# Patient Record
Sex: FEMALE | Race: WHITE
[De-identification: names, ages, dates, MRNs, and addresses within clinical notes are randomized per-mention and may not be internally consistent; named-entity substitution may affect disease eponyms.]

---

## 2020-12-21 ENCOUNTER — HOSPITAL ENCOUNTER (OUTPATIENT)
Dept: HOSPITAL 76 - DI.N | Age: 40
Discharge: HOME | End: 2020-12-21
Attending: FAMILY MEDICINE
Payer: MEDICARE

## 2020-12-21 DIAGNOSIS — M25.511: Primary | ICD-10-CM

## 2020-12-21 NOTE — XRAY REPORT
PROCEDURE:  Shoulder 3 View RT

 

INDICATIONS:  RIGHT SHOULDER PAIN

 

TECHNIQUE:  3 views of the shoulder were acquired.  

 

COMPARISON:  None.

 

FINDINGS:  

 

Bones:  No fractures or dislocations.  No suspicious bony lesions.  Visualized ribs appear intact.  

 

Soft tissues:  No suspicious soft tissue calcifications.  

 

IMPRESSION:  Unremarkable radiographic examination of right shoulder. If indicated, MRI of shoulder c
an be done for further evaluation.

 

Reviewed by: Shakeel Flanagan MD on 12/21/2020 10:33 AM PST

Approved by: Shakeel Flanagan MD on 12/21/2020 10:33 AM PST

 

 

Station ID:  [id number]

## 2021-02-16 ENCOUNTER — HOSPITAL ENCOUNTER (OUTPATIENT)
Dept: HOSPITAL 76 - DI | Age: 41
Discharge: HOME | End: 2021-02-16
Attending: OBSTETRICS & GYNECOLOGY
Payer: MEDICARE

## 2021-02-16 DIAGNOSIS — Z30.431: Primary | ICD-10-CM

## 2021-02-17 NOTE — ULTRASOUND REPORT
PROCEDURE:  Pelvic w/Transvaginal

 

INDICATIONS:  IUD CHECK

 

TECHNIQUE:  

Real-time scanning was performed of the pelvic organs, with image documentation.  Additional endovagi
nal scanning was necessary due to incomplete visualization of the adnexal and endometrial structures 
by transabdominal scanning.  

 

COMPARISON:  None.

 

FINDINGS:  

No pathologic free abdominal or pelvic fluid.  

 

Uterus:  Uterus is normal in size at 3.8 x 4.8 x 7.6 cm.  The endometrium measures normal in combined
 thickness, and there is a centrally positioned IUD..  

 

Ovaries:  Show no evidence of abnormal enlargement, solid mass or dominant cyst.

 

IMPRESSION:  

Centrally positioned IUD, no solid mass lesion involving the normal sized ovaries.

 

Reviewed by: Constantin Smith MD on 2/17/2021 10:49 AM PST

Approved by: Constantin Smith MD on 2/17/2021 10:49 AM PST

 

 

Station ID:  [id number]

## 2021-04-19 ENCOUNTER — HOSPITAL ENCOUNTER (OUTPATIENT)
Dept: HOSPITAL 76 - LAB | Age: 41
Discharge: HOME | End: 2021-04-19
Attending: FAMILY MEDICINE
Payer: MEDICARE

## 2021-04-19 DIAGNOSIS — Z79.899: Primary | ICD-10-CM

## 2021-04-19 LAB
AMPHET UR QL SCN: NEGATIVE
BARBITURATES UR QL SCN>300 NG/ML: NEGATIVE
BENZODIAZ UR QL SCN: POSITIVE
COCAINE UR-SCNC: NEGATIVE UMOL/L
METHADONE UR QL SCN: NEGATIVE
METHAMPHET UR QL SCN: NEGATIVE
OPIATES UR QL SCN: NEGATIVE
THC UR QL SCN: POSITIVE
VOLATILE DRUGS POS SERPL SCN: (no result)

## 2021-04-19 PROCEDURE — 80306 DRUG TEST PRSMV INSTRMNT: CPT

## 2021-08-03 ENCOUNTER — HOSPITAL ENCOUNTER (EMERGENCY)
Dept: HOSPITAL 76 - ED | Age: 41
Discharge: HOME | End: 2021-08-03
Payer: MEDICARE

## 2021-08-03 VITALS — SYSTOLIC BLOOD PRESSURE: 131 MMHG | DIASTOLIC BLOOD PRESSURE: 68 MMHG

## 2021-08-03 DIAGNOSIS — M25.561: ICD-10-CM

## 2021-08-03 DIAGNOSIS — R07.89: Primary | ICD-10-CM

## 2021-08-03 LAB
D DIMER CSF LA.DDU-MCNC: < 200 NG/ML (ref 200–255)
INR PPP: 1.3 (ref 0.8–1.2)
PROTHROM ACT/NOR PPP: 14 SECS (ref 9.9–12.6)

## 2021-08-03 PROCEDURE — 85610 PROTHROMBIN TIME: CPT

## 2021-08-03 PROCEDURE — 36415 COLL VENOUS BLD VENIPUNCTURE: CPT

## 2021-08-03 PROCEDURE — 85379 FIBRIN DEGRADATION QUANT: CPT

## 2021-08-03 PROCEDURE — 99284 EMERGENCY DEPT VISIT MOD MDM: CPT

## 2021-08-03 NOTE — ED PHYSICIAN DOCUMENTATION
PD HPI CHEST PAIN





- Stated complaint


Stated Complaint: R KNEE PX





- Chief complaint


Chief Complaint: Cardiac





- History obtained from


History obtained from: Patient





- Additional information


Additional information: 


Patient comes emergency department chief complaint of pain behind right knee and

intermittent left-sided chest pain.  Patient states she feels as though the knee

has been flaring up for the last few days.  She states that it started with a 

"catching" pain deep inside her knee, and that when that flares up, she also 

gets a pulse of pain in her left anterior chest superior to her breast.  Patient

denies any shortness of breath, but states that sometimes it hurts to take a 

deep breath.  She has had some swelling around the knee, but denies distinct 

injury.  The only thing that she can think of is that about a week ago, she 

tried to kick a soccer ball for her dog and missed the ball and ended up kicking

the ground instead.  However, she states this hurt her toe more than anything.  

The patient does have a distant history of a knee surgery for a chronically 

dislocated patella, she reports.  This was done when she was a teenager.  

Patient's concern with the knee pain and the chest pain is that she does have a 

cousin who  of a pulmonary embolism and patient wants to make sure this is 

not an issue for her.  Patient is not a smoker.  No recent long trips.  No 

hormonal contraceptives.  She is not pregnant.








Review of Systems


Ten Systems: 10 systems reviewed and negative


Constitutional: reports: Reviewed and negative


Eyes: reports: Reviewed and negative


Ears: reports: Reviewed and negative


Nose: reports: Reviewed and negative


Throat: reports: Reviewed and negative


Cardiac: reports: Chest pain / pressure


Respiratory: reports: Reviewed and negative.  denies: Dyspnea


GI: reports: Reviewed and negative


: reports: Reviewed and negative


Skin: reports: Reviewed and negative


Musculoskeletal: reports: Joint pain, Joint swelling


Neurologic: reports: Reviewed and negative


Psychiatric: reports: Reviewed and negative


Endocrine: reports: Reviewed and negative


Immunocompromised: reports: Reviewed and negative





PD PAST MEDICAL HISTORY





- Past Medical History


Past Medical History: Yes


Cardiovascular: Arrhythmia


Respiratory: Asthma


Neuro: Head injury, Migraines


Endocrine/Autoimmune: None


GI: Chronic diarrhea, Other


GYN: Ovarian cysts


: Other


HEENT: None


Psych: None


Musculoskeletal: Osteoarthritis, Fibromyalgia


Derm: Psoriasis


Other Past Medical History: IBS





- Past Surgical History


Past Surgical History: Yes


General: Appendectomy


Ortho: Arthroscopic surgery


HEENT: Other





- Present Medications


Home Medications: 


                                Ambulatory Orders











 Medication  Instructions  Recorded  Confirmed


 


Albuterol 2.5 mg INH Q4H PRN 13


 


Cetirizine [ZyrTEC] 10 mg PO DAILY 13


 


Cranberry 400 mg PO 13


 


Dicyclomine [Bentyl] 20 mg PO QID 13


 


Diphenoxylate HCl/Atropine 1 each PO 13





[Lomotil Tablet]   


 


Multivitamin,Ther and Minerals 1 each PO 13





[Vitamin and Minerals]   


 


Ondansetron Oral Soln [Zofran] 4 mg PO 13


 


Simvastatin [Zocor] 20 mg PO 13


 


Triamcinolone Acetonide [Nasacort 16.5 gm NS 13





Aq]   


 


Zolpidem Tartrate [Ambien] 5 mg PO 13


 


atenoloL [Tenormin] 25 mg PO DAILY 13


 


Oxycodone HCl/Acetaminophen 1 - 2 each PO Q6H PRN #10 tablet 14 





[Percocet 5-325 mg Tablet]   














- Allergies


Allergies/Adverse Reactions: 


                                    Allergies











Allergy/AdvReac Type Severity Reaction Status Date / Time


 


Penicillins Allergy Intermediate Hives Verified 21 13:41


 


codeine [Codeine] Allergy  Hives Verified 21 13:41














- Social History


Does the pt smoke?: No


Smoking Status: Never smoker


Does the pt drink ETOH?: No


Does the pt have substance abuse?: No


Substance Use and Type: Marijuana





- Immunizations


Immunizations are current?: Yes





PD ED PE NORMAL





- Vitals


Vital signs reviewed: Yes





- General


General: Alert and oriented X 3, No acute distress, Well developed/nourished





- HEENT


HEENT: Atraumatic, PERRL, EOMI, Moist mucous membranes





- Neck


Neck: Supple, no meningeal sign





- Cardiac


Cardiac: RRR, No murmur





- Respiratory


Respiratory: No respiratory distress, Clear bilaterally





- Abdomen


Abdomen: Soft, Non tender, Non distended





- Derm


Derm: Normal color, Warm and dry, No rash





- Extremities


Extremities: No deformity, No edema, Other (Obese legs; no obvious edema; 

tenderness of R popliteal area and several centimeters distally.  No cords, 

mass, or knee instability.)





- Neuro


Neuro: Alert and oriented X 3, CNs 2-12 intact, No motor deficit, No sensory 

deficit, Normal speech





- Psych


Psych: Normal mood, Normal affect





PD ED PE EXPANDED





- Free text exam


Free text exam: 





Reproducible chest pain/tenderness left anterior superior chest wall between 

ribs 2 and 4.





Results





- Vitals


Vitals: 


                               Vital Signs - 24 hr











  21





  13:35 14:03 16:28


 


Temperature 66 C H 37.1 C 36.7 C


 


Heart Rate 68 66 56 L


 


Respiratory 18 18 16





Rate   


 


Blood Pressure 150/79 H 145/96 H 131/68 H


 


O2 Saturation 98 99 97








                                     Oxygen











O2 Source                      Room air

















- Labs


Labs: 


                                Laboratory Tests











  21





  13:55


 


PT  14.0 H


 


INR  1.3 H


 


D-Dimer  < 200.0 L














- Rads (name of study)


  ** R knee xr


Radiology: Final report received, EMP read indepedently, See rad report (neg)





  ** rle us


Radiology: Final report received, See rad report (neg dvt)





  ** cxr


Radiology: Final report received, EMP read indepedently, See rad report (neg)





PD MEDICAL DECISION MAKING





- ED course


Complexity details: reviewed results, re-evaluated patient, considered 

differential, d/w patient


ED course: 


Patient was worked up with ultrasound of the right lower extremity, as well as 

x-rays of the knee and chest and a D-dimer.  The entire work-up was negative.  I

 discussed with the patient that I am not sure what has caused her knee to 

flareup, but I do not find evidence of a DVT.  As far as her chest, she has 

point tenderness and I suspect, based on this and the increased pain with deep 

breaths, as well as the negative x-ray, that the pain represents a 

musculoskeletal/chest wall source.  We have discussed management of the symptoms

 at home, as well as the usual indications for return.








Departure





- Departure


Disposition: 01 Home, Self Care


Clinical Impression: 


 Chest wall pain





Knee pain, right


Qualifiers:


 Chronicity: acute Qualified Code(s): M25.561 - Pain in right knee





Condition: Stable


Instructions:  ED Chest Pain Costochondritis, ED Knee Pain UKO


Comments: 


Your chest x-ray, knee x-ray, and leg ultrasound all look good.  The lab that we

 check to look for blood clots, the D-dimer, looks good alsoit is completely 

negative.  This is a good sign in terms of any concern over blood clot.  It is 

not clear exactly what is causing your knee pain, but In general, flareups like 

this tend to resolve on their own.  If you have ongoing knee pain for more than 

the next few weeks, you should make an appointment in primary care to discuss 

having an MRI done.  Your chest pain sounds mechanical and as it is coming from 

the chest wall.  Your x-ray does not show any concerning findings to raise 

suspicion for anything else.  As we have discussed, between the negative leg 

ultrasound and the negative D-dimer, it is extremely unlikely that you have a 

blood clot.  Please follow-up with your primary doctor as needed.  Take 

ibuprofen and Tylenol and use ice if needed to help with your knee and chest 

wall pain.


Discharge Date/Time: 21 16:39

## 2021-08-03 NOTE — ULTRASOUND REPORT
PROCEDURE:  Duplex Ext Veins Right

 

INDICATIONS:  R leg pain, swelling

 

TECHNIQUE:  

Real-time imaging, as well as color and pulse Doppler interrogation, were performed of the lower extr
emity deep veins from the inguinal ligament to the popliteal fossa.  

 

COMPARISON:  None.

 

FINDINGS:  The deep veins are normally compressible, and free of intraluminal thrombus.  Color and pu
lse Doppler demonstrate normal phasic intraluminal flow.  There is normal augmentation response to di
stal compression maneuver.  

 

IMPRESSION:  No sonographic evidence of DVT with caveat that the exam is mildly limited by the degree
 of lower chimney edema below the level of the calf.

 

Reviewed by: Arturo Subramanian MD on 8/3/2021 3:12 PM PDT

Approved by: Arturo Subramanian MD on 8/3/2021 3:12 PM PDT

 

 

Station ID:  [id number]

## 2021-08-03 NOTE — XRAY REPORT
PROCEDURE:  Chest 1 View X-Ray

 

INDICATIONS:  chest pain

 

TECHNIQUE:  One view of the chest was acquired.  

 

COMPARISON:  Plain films dated 9/8/2013

 

FINDINGS:  

 

Surgical changes and devices:  None.  

 

Lungs and pleura:  No pleural effusions or pneumothorax.  Lungs are clear.  

 

Mediastinum:  Mediastinal contours appear normal.  Heart size is normal.  

 

Bones and chest wall:  No suspicious bony lesions.  Overlying soft tissues appear unremarkable.  

 

IMPRESSION:  

No acute process.

 

Reviewed by: Catalina Hutchinson MD on 8/3/2021 2:11 PM PDT

Approved by: Catalina Hutchinson MD on 8/3/2021 2:11 PM PDT

 

 

Station ID:  [id number]

## 2021-08-03 NOTE — XRAY REPORT
PROCEDURE:  Knee 3 View RT

 

INDICATIONS:  pain/swelling

 

TECHNIQUE:  3 views of the     knee(s) were acquired.  

 

COMPARISON:  None.

 

FINDINGS:  

 

Bones:  No fractures or dislocations.  No suspicious bony lesions.  

 

Soft tissues:  No joint effusion.  No suspicious soft tissue calcifications.  

 

IMPRESSION:  No acute fracture. No osseous lesion. If symptoms and/or clinical suspicion for patholog
y continue, further assessment with repeat plain films, or advanced imaging (e.g., CT, MRI, or bone s
can) is recommended for further assessment.

 

Reviewed by: Catalina Hutchinson MD on 8/3/2021 4:19 PM PDT

Approved by: Catalina Hutchinson MD on 8/3/2021 4:19 PM PDT

 

 

Station ID:  [id number]

## 2021-09-08 ENCOUNTER — HOSPITAL ENCOUNTER (EMERGENCY)
Dept: HOSPITAL 76 - ED | Age: 41
LOS: 1 days | Discharge: HOME | End: 2021-09-09
Payer: MEDICARE

## 2021-09-08 DIAGNOSIS — M25.562: Primary | ICD-10-CM

## 2021-09-08 PROCEDURE — 99282 EMERGENCY DEPT VISIT SF MDM: CPT

## 2021-09-08 PROCEDURE — 99284 EMERGENCY DEPT VISIT MOD MDM: CPT

## 2021-09-08 PROCEDURE — 93971 EXTREMITY STUDY: CPT

## 2021-09-08 NOTE — ED PHYSICIAN DOCUMENTATION
History of Present Illness





- Stated complaint


Stated Complaint: LT LEG SWELLING





- Chief complaint


Chief Complaint: Ext Problem





- Additonal information


Additional information: 





40-year-old female presents emergency department for evaluation of left knee 

pain and left leg swelling that she states began about 5 days ago.  She began 

having pain behind her left knee that radiated down the calf.  She feels that 

her foot is now swollen though this is a subjective finding only.  There is no 

chest pain or shortness of air.  Seen recently a few months ago for similar in 

the right leg with negative DVT ultrasound.  She is requesting an ultrasound 

this evening 12 DVT.





She is not on OCP.  No history of DVT or cancer in the past.  No recent 

immobilization or surgeries.





Review of Systems


Constitutional: denies: Fever, Chills


Eyes: reports: Reviewed and negative


Nose: reports: Reviewed and negative


Throat: reports: Reviewed and negative


Cardiac: reports: Reviewed and negative


Respiratory: reports: Reviewed and negative


GI: reports: Reviewed and negative


: reports: Reviewed and negative





PD PAST MEDICAL HISTORY





- Past Medical History


Past Medical History: Yes


Cardiovascular: Arrhythmia


Respiratory: Asthma


Neuro: Head injury, Migraines


Endocrine/Autoimmune: None


GI: Chronic diarrhea, Other


GYN: Ovarian cysts


: Other


HEENT: None


Psych: None


Musculoskeletal: Osteoarthritis, Fibromyalgia


Derm: Psoriasis





- Past Surgical History


Past Surgical History: Yes


General: Appendectomy


Ortho: Arthroscopic surgery


HEENT: Other





- Present Medications


Home Medications: 


                                Ambulatory Orders











 Medication  Instructions  Recorded  Confirmed


 


Albuterol 2.5 mg INH Q4H PRN 09/08/13 09/08/13


 


Cetirizine [ZyrTEC] 10 mg PO DAILY 09/08/13 09/08/13


 


Cranberry 400 mg PO 09/08/13 09/08/13


 


Dicyclomine [Bentyl] 20 mg PO QID 09/08/13 09/08/13


 


Diphenoxylate HCl/Atropine 1 each PO 09/08/13 09/08/13





[Lomotil Tablet]   


 


Multivitamin,Ther and Minerals 1 each PO 09/08/13 09/08/13





[Vitamin and Minerals]   


 


Ondansetron Oral Soln [Zofran] 4 mg PO 09/08/13 09/08/13


 


Simvastatin [Zocor] 20 mg PO 09/08/13 09/08/13


 


Triamcinolone Acetonide [Nasacort 16.5 gm NS 09/08/13 09/08/13





Aq]   


 


Zolpidem Tartrate [Ambien] 5 mg PO 09/08/13 09/08/13


 


atenoloL [Tenormin] 25 mg PO DAILY 09/08/13 09/08/13


 


Oxycodone HCl/Acetaminophen 1 - 2 each PO Q6H PRN #10 tablet 06/06/14 





[Percocet 5-325 mg Tablet]   














- Allergies


Allergies/Adverse Reactions: 


                                    Allergies











Allergy/AdvReac Type Severity Reaction Status Date / Time


 


Penicillins Allergy Intermediate Hives Verified 09/08/21 20:55


 


codeine [Codeine] Allergy  Hives Verified 09/08/21 20:55














- Social History


Does the pt smoke?: No


Smoking Status: Never smoker


Does the pt drink ETOH?: No


Does the pt have substance abuse?: No





- Immunizations


Immunizations are current?: Yes





PD ED PE NORMAL





- General


General: Alert and oriented X 3, No acute distress





- HEENT


HEENT: PERRL





- Neck


Neck: Supple, no meningeal sign





- Cardiac


Cardiac: RRR, No murmur





- Respiratory


Respiratory: Clear bilaterally





- Abdomen


Abdomen: Normal bowel sounds, Soft, Non tender, Non distended





- Back


Back: No CVA TTP, No spinal TTP





- Derm


Derm: Normal color, Warm and dry, No rash





- Extremities


Extremities: No tenderness to palpate, Other (No swelling noted of bilateral 

lower extremities.).  No: No calf tenderness / cord (Mild tenderness behind the 

posterior left knee and tenderness extending down to the calf without swelling 

or erythema.)





- Neuro


Neuro: Alert and oriented X 3


Eye Opening: Spontaneous


Motor: Obeys Commands


Verbal: Oriented


GCS Score: 15





- Psych


Psych: Normal mood





Results





- Vitals


Vitals: 


                               Vital Signs - 24 hr











  09/08/21 09/08/21





  20:55 21:14


 


Temperature 36.5 C 36.5 C


 


Heart Rate 77 77


 


Respiratory 16 16





Rate  


 


Blood Pressure 141/88 H 141/88 H


 


O2 Saturation 96 96








                                     Oxygen











O2 Source                      Room air

















PD MEDICAL DECISION MAKING





- ED course


Complexity details: reviewed results, re-evaluated patient, d/w patient


ED course: 





40-year-old female presents emergency department for evaluation of 5 days 

subjective concern of left lower extremity swelling and posterior calf pain.  On

 exam there is no swelling in either extremity.  However posterior calf pain 

tenderness was elicited.  Ultrasound DVT is pending assuming that the result 

will be negative Dr. Dawkins will follow up results and likely discharge home.





Departure





- Departure


Clinical Impression: 


 Swelling of left lower extremity





Condition: Stable


Record reviewed to determine appropriate education?: Yes


Comments: 


Leigh Ann you are seen in the emergency department today for concerns of swelling in

 your left lower leg.  The ultrasound did not show a deep vein thrombus.  It is 

important you continue to follow-up with your primary care provider.  If at any 

point you develop chest pain, have shortness of air develop fevers or redness in

 your extremities then please return immediately to the ER for a second 

evaluation.

## 2021-09-09 VITALS — SYSTOLIC BLOOD PRESSURE: 129 MMHG | DIASTOLIC BLOOD PRESSURE: 89 MMHG

## 2021-09-09 NOTE — ULTRASOUND REPORT
PROCEDURE:  Duplex Ext Veins Left

 

INDICATIONS:  swelling, r/o dvt

 

TECHNIQUE:  

Real-time imaging, as well as color and pulse Doppler interrogation, were performed of the lower extr
emity deep veins from the inguinal ligament to the popliteal fossa.  

 

COMPARISON:  None.

 

FINDINGS:  The deep veins are normally compressible, and free of intraluminal thrombus.  Color and pu
lse Doppler demonstrate normal phasic intraluminal flow.  There is normal augmentation response to di
stal compression maneuver.  

 

IMPRESSION:  No evidence of deep vein thrombosis involving the left lower extremity.

 

Reviewed by: Felecia March MD, PhD on 9/9/2021 7:24 AM PDT

Approved by: Felecia March MD, PhD on 9/9/2021 7:24 AM PDT

 

 

Station ID:  [id number]

## 2021-09-09 NOTE — ED PHYSICIAN DOCUMENTATION
ED Addendum





- Addendum


Addendum: 





09/09/21 00:11Following up on the patient's ultrasound report which is negative 

for DVT.  I discussed this with the patient.  She is still wondering about the 

cause of the swelling in the knee in particular.  Exam of the knee shows a mild 

effusion mainly in the anterior aspect but a bit diffusely.  Limited range of 

motion of the knee due to stiffness.  Cruciate and collateral ligaments feel 

sturdy and there is no pain elicited on stress testing.  There is slight 

stiffness to full extension and she states it feels pressured.  This is likely 

from the effusion.  She does describe some giving out of her knee at times.  In 

sum, I would presume there is meniscal inflammation or partial tear.  She is 

given an articulating knee brace to use and instructed to try some anti-

inflammatories with that.

## 2021-09-30 ENCOUNTER — HOSPITAL ENCOUNTER (OUTPATIENT)
Dept: HOSPITAL 76 - LAB | Age: 41
Discharge: HOME | End: 2021-09-30
Attending: FAMILY MEDICINE
Payer: MEDICARE

## 2021-09-30 DIAGNOSIS — Z79.899: ICD-10-CM

## 2021-09-30 DIAGNOSIS — R35.0: ICD-10-CM

## 2021-09-30 DIAGNOSIS — I10: ICD-10-CM

## 2021-09-30 DIAGNOSIS — E78.2: Primary | ICD-10-CM

## 2021-09-30 LAB
ALBUMIN DIAFP-MCNC: 4.7 G/DL (ref 3.2–5.5)
ALBUMIN/GLOB SERPL: 1.5 {RATIO} (ref 1–2.2)
ALP SERPL-CCNC: 68 IU/L (ref 42–121)
ALT SERPL W P-5'-P-CCNC: 12 IU/L (ref 10–60)
AMPHET UR QL SCN: NEGATIVE
ANION GAP SERPL CALCULATED.4IONS-SCNC: 11 MMOL/L (ref 6–13)
AST SERPL W P-5'-P-CCNC: 12 IU/L (ref 10–42)
BARBITURATES UR QL SCN>300 NG/ML: NEGATIVE
BASOPHILS NFR BLD AUTO: 0 10^3/UL (ref 0–0.1)
BASOPHILS NFR BLD AUTO: 0.4 %
BENZODIAZ UR QL SCN: POSITIVE
BILIRUB BLD-MCNC: 0.5 MG/DL (ref 0.2–1)
BUN SERPL-MCNC: 12 MG/DL (ref 6–20)
CALCIUM UR-MCNC: 9.8 MG/DL (ref 8.5–10.3)
CHLORIDE SERPL-SCNC: 104 MMOL/L (ref 101–111)
CHOLEST SERPL-MCNC: 159 MG/DL
CLARITY UR REFRACT.AUTO: CLEAR
CO2 SERPL-SCNC: 26 MMOL/L (ref 21–32)
COCAINE UR-SCNC: NEGATIVE UMOL/L
CREAT SERPLBLD-SCNC: 0.6 MG/DL (ref 0.4–1)
EOSINOPHIL # BLD AUTO: 0.1 10^3/UL (ref 0–0.7)
EOSINOPHIL NFR BLD AUTO: 1.1 %
ERYTHROCYTE [DISTWIDTH] IN BLOOD BY AUTOMATED COUNT: 12.4 % (ref 12–15)
GFRSERPLBLD MDRD-ARVRAT: 109 ML/MIN/{1.73_M2} (ref 89–?)
GLOBULIN SER-MCNC: 3.2 G/DL (ref 2.1–4.2)
GLUCOSE SERPL-MCNC: 105 MG/DL (ref 70–100)
GLUCOSE UR QL STRIP.AUTO: NEGATIVE MG/DL
HCT VFR BLD AUTO: 43.1 % (ref 37–47)
HDLC SERPL-MCNC: 41 MG/DL
HDLC SERPL: 3.9 {RATIO} (ref ?–4.4)
HGB UR QL STRIP: 14.5 G/DL (ref 12–16)
KETONES UR QL STRIP.AUTO: NEGATIVE MG/DL
LDLC SERPL CALC-MCNC: 87 MG/DL
LDLC/HDLC SERPL: 2.1 {RATIO} (ref ?–4.4)
LYMPHOCYTES # SPEC AUTO: 2.2 10^3/UL (ref 1.5–3.5)
LYMPHOCYTES NFR BLD AUTO: 28.1 %
MCH RBC QN AUTO: 29.5 PG (ref 27–31)
MCHC RBC AUTO-ENTMCNC: 33.6 G/DL (ref 32–36)
MCV RBC AUTO: 87.6 FL (ref 81–99)
METHADONE UR QL SCN: NEGATIVE
METHAMPHET UR QL SCN: NEGATIVE
MONOCYTES # BLD AUTO: 0.4 10^3/UL (ref 0–1)
MONOCYTES NFR BLD AUTO: 5.2 %
NEUTROPHILS # BLD AUTO: 5.2 10^3/UL (ref 1.5–6.6)
NEUTROPHILS # SNV AUTO: 8 X10^3/UL (ref 4.8–10.8)
NEUTROPHILS NFR BLD AUTO: 65.1 %
NITRITE UR QL STRIP.AUTO: NEGATIVE
NRBC # BLD AUTO: 0 /100WBC
NRBC # BLD AUTO: 0 X10^3/UL
OPIATES UR QL SCN: NEGATIVE
PDW BLD AUTO: 9 FL (ref 7.9–10.8)
PH UR STRIP.AUTO: 5.5 PH (ref 5–7.5)
PLATELET # BLD: 266 10^3/UL (ref 130–450)
POTASSIUM SERPL-SCNC: 4.3 MMOL/L (ref 3.5–5)
PROT SPEC-MCNC: 7.9 G/DL (ref 6.7–8.2)
PROT UR STRIP.AUTO-MCNC: NEGATIVE MG/DL
RBC # UR STRIP.AUTO: NEGATIVE /UL
RBC # URNS HPF: (no result) /HPF (ref 0–5)
RBC MAR: 4.92 10^6/UL (ref 4.2–5.4)
SODIUM SERPLBLD-SCNC: 141 MMOL/L (ref 135–145)
SP GR UR STRIP.AUTO: 1.02 (ref 1–1.03)
SQUAMOUS URNS QL MICRO: (no result)
THC UR QL SCN: POSITIVE
TRIGL P FAST SERPL-MCNC: 155 MG/DL
UROBILINOGEN UR QL STRIP.AUTO: (no result) E.U./DL
UROBILINOGEN UR STRIP.AUTO-MCNC: NEGATIVE MG/DL
VLDLC SERPL-SCNC: 31 MG/DL
VOLATILE DRUGS POS SERPL SCN: (no result)
WBC # UR MANUAL: (no result) /HPF (ref 0–5)

## 2021-09-30 PROCEDURE — 85025 COMPLETE CBC W/AUTO DIFF WBC: CPT

## 2021-09-30 PROCEDURE — 80053 COMPREHEN METABOLIC PANEL: CPT

## 2021-09-30 PROCEDURE — 80306 DRUG TEST PRSMV INSTRMNT: CPT

## 2021-09-30 PROCEDURE — 36415 COLL VENOUS BLD VENIPUNCTURE: CPT

## 2021-09-30 PROCEDURE — 83721 ASSAY OF BLOOD LIPOPROTEIN: CPT

## 2021-09-30 PROCEDURE — 81001 URINALYSIS AUTO W/SCOPE: CPT

## 2021-09-30 PROCEDURE — 80061 LIPID PANEL: CPT

## 2021-09-30 PROCEDURE — 87086 URINE CULTURE/COLONY COUNT: CPT

## 2021-11-01 ENCOUNTER — HOSPITAL ENCOUNTER (EMERGENCY)
Dept: HOSPITAL 76 - ED | Age: 41
Discharge: HOME | End: 2021-11-01
Payer: MEDICARE

## 2021-11-01 VITALS — SYSTOLIC BLOOD PRESSURE: 162 MMHG | DIASTOLIC BLOOD PRESSURE: 96 MMHG

## 2021-11-01 DIAGNOSIS — R10.84: Primary | ICD-10-CM

## 2021-11-01 DIAGNOSIS — K58.0: ICD-10-CM

## 2021-11-01 LAB
ALBUMIN DIAFP-MCNC: 4.3 G/DL (ref 3.2–5.5)
ALBUMIN/GLOB SERPL: 1.3 {RATIO} (ref 1–2.2)
ALP SERPL-CCNC: 64 IU/L (ref 42–121)
ALT SERPL W P-5'-P-CCNC: 11 IU/L (ref 10–60)
ANION GAP SERPL CALCULATED.4IONS-SCNC: 8 MMOL/L (ref 6–13)
AST SERPL W P-5'-P-CCNC: 13 IU/L (ref 10–42)
BASOPHILS NFR BLD AUTO: 0 10^3/UL (ref 0–0.1)
BASOPHILS NFR BLD AUTO: 0.4 %
BILIRUB BLD-MCNC: 0.4 MG/DL (ref 0.2–1)
BUN SERPL-MCNC: 12 MG/DL (ref 6–20)
CALCIUM UR-MCNC: 9.2 MG/DL (ref 8.5–10.3)
CHLORIDE SERPL-SCNC: 99 MMOL/L (ref 101–111)
CLARITY UR REFRACT.AUTO: CLEAR
CO2 SERPL-SCNC: 28 MMOL/L (ref 21–32)
CREAT SERPLBLD-SCNC: 0.7 MG/DL (ref 0.4–1)
EOSINOPHIL # BLD AUTO: 0.1 10^3/UL (ref 0–0.7)
EOSINOPHIL NFR BLD AUTO: 1.6 %
ERYTHROCYTE [DISTWIDTH] IN BLOOD BY AUTOMATED COUNT: 12.1 % (ref 12–15)
GFRSERPLBLD MDRD-ARVRAT: 91 ML/MIN/{1.73_M2} (ref 89–?)
GLOBULIN SER-MCNC: 3.2 G/DL (ref 2.1–4.2)
GLUCOSE SERPL-MCNC: 109 MG/DL (ref 70–100)
GLUCOSE UR QL STRIP.AUTO: NEGATIVE MG/DL
HCG UR QL: NEGATIVE
HCT VFR BLD AUTO: 40.9 % (ref 37–47)
HGB UR QL STRIP: 14 G/DL (ref 12–16)
KETONES UR QL STRIP.AUTO: NEGATIVE MG/DL
LIPASE SERPL-CCNC: 21 U/L (ref 22–51)
LYMPHOCYTES # SPEC AUTO: 1.8 10^3/UL (ref 1.5–3.5)
LYMPHOCYTES NFR BLD AUTO: 21.2 %
MCH RBC QN AUTO: 29.8 PG (ref 27–31)
MCHC RBC AUTO-ENTMCNC: 34.2 G/DL (ref 32–36)
MCV RBC AUTO: 87 FL (ref 81–99)
MONOCYTES # BLD AUTO: 0.5 10^3/UL (ref 0–1)
MONOCYTES NFR BLD AUTO: 5.7 %
NEUTROPHILS # BLD AUTO: 5.9 10^3/UL (ref 1.5–6.6)
NEUTROPHILS # SNV AUTO: 8.3 X10^3/UL (ref 4.8–10.8)
NEUTROPHILS NFR BLD AUTO: 70.6 %
NITRITE UR QL STRIP.AUTO: NEGATIVE
NRBC # BLD AUTO: 0 /100WBC
NRBC # BLD AUTO: 0 X10^3/UL
PDW BLD AUTO: 8.8 FL (ref 7.9–10.8)
PH UR STRIP.AUTO: 5 PH (ref 5–7.5)
PLATELET # BLD: 252 10^3/UL (ref 130–450)
POTASSIUM SERPL-SCNC: 4 MMOL/L (ref 3.5–5)
PROT SPEC-MCNC: 7.5 G/DL (ref 6.7–8.2)
PROT UR STRIP.AUTO-MCNC: NEGATIVE MG/DL
RBC # UR STRIP.AUTO: NEGATIVE /UL
RBC MAR: 4.7 10^6/UL (ref 4.2–5.4)
SODIUM SERPLBLD-SCNC: 135 MMOL/L (ref 135–145)
SP GR UR STRIP.AUTO: 1.02 (ref 1–1.03)
UROBILINOGEN UR QL STRIP.AUTO: (no result) E.U./DL
UROBILINOGEN UR STRIP.AUTO-MCNC: NEGATIVE MG/DL

## 2021-11-01 PROCEDURE — 81025 URINE PREGNANCY TEST: CPT

## 2021-11-01 PROCEDURE — 83690 ASSAY OF LIPASE: CPT

## 2021-11-01 PROCEDURE — 85025 COMPLETE CBC W/AUTO DIFF WBC: CPT

## 2021-11-01 PROCEDURE — 87086 URINE CULTURE/COLONY COUNT: CPT

## 2021-11-01 PROCEDURE — 81001 URINALYSIS AUTO W/SCOPE: CPT

## 2021-11-01 PROCEDURE — 81003 URINALYSIS AUTO W/O SCOPE: CPT

## 2021-11-01 PROCEDURE — 36415 COLL VENOUS BLD VENIPUNCTURE: CPT

## 2021-11-01 PROCEDURE — 99284 EMERGENCY DEPT VISIT MOD MDM: CPT

## 2021-11-01 PROCEDURE — 80053 COMPREHEN METABOLIC PANEL: CPT

## 2021-11-01 PROCEDURE — 74177 CT ABD & PELVIS W/CONTRAST: CPT

## 2021-11-01 NOTE — CT REPORT
PROCEDURE:  Abdomen/Pelvis W

 

INDICATIONS:  Diffuse abdominal pain x 2 days

 

CONTRAST:  IV CONTRAST: Optiray 320 ml: 100 PO CONTRAST: Optiray 320 ml50

 

TECHNIQUE:  

After the administration of IV and oral contrast, 5 mm thick sections acquired from the diaphragms to
 the symphysis.  5 mm thick coronal and sagittal reformats were acquired.  For radiation dose reducti
on, the following was used:  automated exposure control, adjustment of mA and/or kV according to peggy
ent size.  

 

COMPARISON:  None.

 

FINDINGS:

 

Inferior chest:  No focal consolidation, pleural effusion, or pneumothorax. 3.7 mm noncalcified nodul
e in the right lower lobe. No cardiomegaly or pericardial effusion.  

 

Gallbladder:  The gallbladder is distended with a smooth thin wall. 

 

Biliary tree: No intra-or extrahepatic biliary ductal dilatation.

 

Liver: The liver demonstrates normal enhancement and contour.  Decreased attenuation, compatible hepa
tic steatosis.

 

Spleen: Normal enhancement, size and morphology is seen. 

 

Pancreas: Normal morphology without masses or inflammatory changes.

 

Adrenals: Normal size without masses.

 

Kidneys/ureters: Normal size and morphology. No solid masses or hydronephrosis. 

 

Vasculature: No evidence of aneurysm or other significant vascular pathology.   

 

Lymphatic system: No pathologic enlargement by size criteria.

 

GI/mesentery: No evidence of intestinal obstruction. Cecal suture material, suggesting appendectomy.

 

Peritoneum/Retroperitoneum: No free intraperitoneal gas or large collection.

 

Urinary bladder: The urinary bladder is distended with a smooth thin wall. 

 

Pelvic organs: No significant abnormality. An intrauterine device is seen in situ.

 

Bones/soft tissues: No significant abnormality. Trace fat-containing periumbilical hernia.

 

IMPRESSION:

1.No significant abnormality.

 

 

Reviewed by: Gordon Millan MD on 11/1/2021 2:50 PM PDT

Approved by: Gordon Millan MD on 11/1/2021 2:50 PM PDT

 

 

Station ID:  [id number]

## 2021-11-01 NOTE — ED PHYSICIAN DOCUMENTATION
PD HPI ABD PAIN





- Stated complaint


Stated Complaint: STOMACH PX/NAUSEA





- Chief complaint


Chief Complaint: Abd Pain





- History obtained from


History obtained from: Patient





- History of Present Illness


Timing - duration: Days (2)


Pain level max: 8


Pain level now: 6


Quality: Cramping


Location: All over / everywhere


Radiation: Left flank, Right flank.  No: Chest, , Lower back, Right shoulder, 

Upper back


Associated symptoms: Nausea, Diarrhea.  No: Fever, Hematemesis





- Additional information


Additional information: 





Patient is a 41-year-old female who presents to the emergency department with 

worsening abdominal pain for the past 2 days.  She has chronic abdominal pain 

secondary to IBS.  She states she is on Percocet at home for this.  She is also 

on dicyclomine.  She states the pain comes and goes, described as cramping.  She

states it is all over the abdomen.  She states she has had some mild dysuria as 

well.  Nothing makes it better or worse.  Has chronic diarrhea, states could be 

up to 10-20 times per day.  She is on Lomotil for this.





Review of Systems


Constitutional: denies: Fever, Chills


Nose: denies: Rhinorrhea / runny nose, Congestion


Throat: denies: Sore throat


Cardiac: denies: Chest pain / pressure


Respiratory: denies: Cough


: denies: Dysuria, Frequency, Hesitancy, Discharge, Now pregnant EGA


Skin: denies: Rash


Musculoskeletal: denies: Neck pain, Back pain





PD PAST MEDICAL HISTORY





- Past Medical History


Cardiovascular: Arrhythmia


Respiratory: Asthma


Neuro: Head injury, Migraines


Endocrine/Autoimmune: None


GI: Chronic diarrhea, Other


GYN: Ovarian cysts


: Other


HEENT: None


Psych: None


Musculoskeletal: Osteoarthritis, Fibromyalgia


Derm: Psoriasis





- Past Surgical History


Past Surgical History: Yes


General: Appendectomy


Ortho: Arthroscopic surgery


HEENT: Other





- Present Medications


Home Medications: 


                                Ambulatory Orders











 Medication  Instructions  Recorded  Confirmed


 


Albuterol 2.5 mg INH Q4H PRN 09/08/13 09/08/13


 


Cetirizine [ZyrTEC] 10 mg PO DAILY 09/08/13 09/08/13


 


Cranberry 400 mg PO 09/08/13 09/08/13


 


Dicyclomine [Bentyl] 20 mg PO QID 09/08/13 09/08/13


 


Diphenoxylate HCl/Atropine 1 each PO 09/08/13 09/08/13





[Lomotil Tablet]   


 


Multivitamin,Ther and Minerals 1 each PO 09/08/13 09/08/13





[Vitamin and Minerals]   


 


Ondansetron Oral Soln [Zofran] 4 mg PO 09/08/13 09/08/13


 


Simvastatin [Zocor] 20 mg PO 09/08/13 09/08/13


 


Triamcinolone Acetonide [Nasacort 16.5 gm NS 09/08/13 09/08/13





Aq]   


 


Zolpidem Tartrate [Ambien] 5 mg PO 09/08/13 09/08/13


 


atenoloL [Tenormin] 25 mg PO DAILY 09/08/13 09/08/13


 


Oxycodone HCl/Acetaminophen 1 - 2 each PO Q6H PRN #10 tablet 06/06/14 





[Percocet 5-325 mg Tablet]   














- Allergies


Allergies/Adverse Reactions: 


                                    Allergies











Allergy/AdvReac Type Severity Reaction Status Date / Time


 


Penicillins Allergy Intermediate Hives Verified 11/01/21 10:36


 


codeine [Codeine] Allergy  Hives Verified 11/01/21 10:36














- Social History


Does the pt smoke?: No


Smoking Status: Never smoker


Does the pt drink ETOH?: No


Does the pt have substance abuse?: No





- Immunizations


Immunizations are current?: Yes





PD ED PE NORMAL





- Vitals


Vital signs reviewed: Yes





- General


General: Alert and oriented X 3, No acute distress, Well developed/nourished





- HEENT


HEENT: Moist mucous membranes





- Neck


Neck: Supple, no meningeal sign





- Cardiac


Cardiac: RRR





- Respiratory


Respiratory: No respiratory distress, Clear bilaterally





- Abdomen


Abdomen: Soft, Non distended, Other (Mild diffuse tenderness without peritoneal 

signs)





- Female 


Female : Pt declined





- Back


Back: No CVA TTP, No spinal TTP





- Derm


Derm: Warm and dry





- Extremities


Extremities: No edema, No calf tenderness / cord





- Neuro


Neuro: Alert and oriented X 3





- Psych


Psych: Normal mood, Normal affect





Results





- Vitals


Vitals: 


                               Vital Signs - 24 hr











  11/01/21 11/01/21 11/01/21





  10:38 12:33 14:00


 


Temperature 36.9 C  


 


Heart Rate 68 56 L 59 L


 


Respiratory 20 18 14





Rate   


 


Blood Pressure 171/106 H 153/76 H 147/71 H


 


O2 Saturation 98 98 99














  11/01/21





  15:25


 


Temperature 37.2 C


 


Heart Rate 65


 


Respiratory 16





Rate 


 


Blood Pressure 162/96 H


 


O2 Saturation 98








                                     Oxygen











O2 Source                      Room air

















- Labs


Labs: 


                                Laboratory Tests











  11/01/21 11/01/21 11/01/21





  10:59 11:05 11:05


 


WBC   8.3 


 


RBC   4.70 


 


Hgb   14.0 


 


Hct   40.9 


 


MCV   87.0 


 


MCH   29.8 


 


MCHC   34.2 


 


RDW   12.1 


 


Plt Count   252 


 


MPV   8.8 


 


Neut # (Auto)   5.9 


 


Lymph # (Auto)   1.8 


 


Mono # (Auto)   0.5 


 


Eos # (Auto)   0.1 


 


Baso # (Auto)   0.0 


 


Absolute Nucleated RBC   0.00 


 


Nucleated RBC %   0.0 


 


Sodium    135


 


Potassium    4.0


 


Chloride    99 L


 


Carbon Dioxide    28


 


Anion Gap    8.0


 


BUN    12


 


Creatinine    0.7


 


Estimated GFR (MDRD)    91


 


Glucose    109 H


 


Calcium    9.2


 


Total Bilirubin    0.4


 


AST    13


 


ALT    11


 


Alkaline Phosphatase    64


 


Total Protein    7.5


 


Albumin    4.3


 


Globulin    3.2


 


Albumin/Globulin Ratio    1.3


 


Lipase    21 L


 


Urine Color  YELLOW  


 


Urine Clarity  CLEAR  


 


Urine pH  5.0  


 


Ur Specific Gravity  1.025  


 


Urine Protein  NEGATIVE  


 


Urine Glucose (UA)  NEGATIVE  


 


Urine Ketones  NEGATIVE  


 


Urine Occult Blood  NEGATIVE  


 


Urine Nitrite  NEGATIVE  


 


Urine Bilirubin  NEGATIVE  


 


Urine Urobilinogen  0.2 (NORMAL)  


 


Ur Leukocyte Esterase  NEGATIVE  


 


Ur Microscopic Review  NOT INDICATED  


 


Urine Culture Comments  NOT INDICATED  


 


Urine HCG, Qual  NEGATIVE  














- Rads (name of study)


  ** CT abdomen and pelvis


Radiology: Final report received, EMP read contemporaneously, See rad report (No

 acute abnormality)





PD MEDICAL DECISION MAKING





- ED course


Complexity details: reviewed results, re-evaluated patient, considered 

differential, d/w patient


ED course: 





41-year-old female with abdominal pain of unclear etiology.  Not having severe 

pain in the emergency department and declines any pain medication here or for 

home.  Tolerating p.o. without difficulty.  She states that she has plenty of 

pain medicine at home.  We will have her follow-up with her doctor for further 

care.  Patient counseled regarding signs and symptoms for which I believe and 

urgent re-evaluation would be necessary. Patient with good understanding of and 

agreement to plan and is comfortable going home at this time





This document was made in part using voice recognition software. While efforts 

are made to proofread this document, sound alike and grammatical errors may 

occur.





Departure





- Departure


Disposition: 01 Home, Self Care


Clinical Impression: 


Abdominal pain


Qualifiers:


 Abdominal location: generalized Qualified Code(s): R10.84 - Generalized 

abdominal pain





Condition: Good


Instructions:  ED Abdominal Pain Female Non-Specific Abdominal Pain


Follow-Up: 


Nikolai Simms MD [Primary Care Provider] - As Needed


Comments: 


The cause of your symptoms is unclear today.  There are no acute findings on l

aboratory testing or CT scan.  Please continue your medications at home and 

return if you worsen.


Discharge Date/Time: 11/01/21 15:29

## 2021-11-23 ENCOUNTER — HOSPITAL ENCOUNTER (OUTPATIENT)
Dept: HOSPITAL 76 - LAB | Age: 41
Discharge: HOME | End: 2021-11-23
Attending: FAMILY MEDICINE
Payer: MEDICARE

## 2021-11-23 DIAGNOSIS — Z11.1: Primary | ICD-10-CM

## 2021-11-23 PROCEDURE — 86480 TB TEST CELL IMMUN MEASURE: CPT

## 2021-11-23 PROCEDURE — 36415 COLL VENOUS BLD VENIPUNCTURE: CPT

## 2021-11-26 LAB
MITOGEN-NIL: 8.6 IU/ML
NIL: 0.02 IU/ML
TB1-NIL: 0 IU/ML
TB2-NIL: 0.01 IU/ML

## 2022-01-11 ENCOUNTER — HOSPITAL ENCOUNTER (OUTPATIENT)
Dept: HOSPITAL 76 - DI.N | Age: 42
Discharge: HOME | End: 2022-01-11
Attending: OBSTETRICS & GYNECOLOGY
Payer: MEDICARE

## 2022-01-11 DIAGNOSIS — Z80.3: ICD-10-CM

## 2022-01-11 DIAGNOSIS — Z12.31: Primary | ICD-10-CM

## 2022-01-14 ENCOUNTER — HOSPITAL ENCOUNTER (OUTPATIENT)
Dept: HOSPITAL 76 - LAB.N | Age: 42
Discharge: HOME | End: 2022-01-14
Attending: PHYSICIAN ASSISTANT
Payer: MEDICARE

## 2022-01-14 DIAGNOSIS — Z20.822: ICD-10-CM

## 2022-01-14 DIAGNOSIS — R09.81: ICD-10-CM

## 2022-01-14 DIAGNOSIS — R05.9: Primary | ICD-10-CM

## 2022-01-18 NOTE — MAMMOGRAPHY REPORT
BILATERAL DIGITAL SCREENING MAMMOGRAM 3D/2D: 1/11/2022

CLINICAL: Baseline exam. Family history of breast cancer. Routine screening.  

 

No prior exams were available for comparison.  There are scattered fibroglandular elements in both br
easts.  

 

 

There is a possible 0.7 cm oval equal density focal asymmetry in the left breast at 5 o'clock posteri
or depth.  

No other significant masses, calcifications, or other findings are seen in either breast.  

 

IMPRESSION: INCOMPLETE: NEEDS ADDITIONAL IMAGING EVALUATION

The possible 0.7 cm oval equal density focal asymmetry in the left breast is indeterminate.  Addition
al views with possible ultrasound are recommended.  

 

 

 

This exam was interpreted at Station ID: [id number].  

 

NOTE: For mammograms, a report in lay terms will be sent to the patient. Approximately 15% of breast 
malignancies will not be visualized mammographically. In the management of a palpable breast mass, a 
negative mammogram must not discourage biopsy of a clinically suspicious lesion.

 

Electronically Signed By: Julito Barlow M.D.          

aty/:1/18/2022 08:05:20  

 

 

 

ACR BI-RADS Category 0: Incomplete [id number]

PARENCHYMAL PATTERN: (A) - The breast(s) demonstrate(s) scattered fibroglandular densities.

BI-RADS CATEGORY: (0) - 0

Mammo and US

[id number]

Immediate follow-up

LATERALITY: (L)

## 2022-05-02 ENCOUNTER — HOSPITAL ENCOUNTER (OUTPATIENT)
Dept: HOSPITAL 76 - LAB | Age: 42
Discharge: HOME | End: 2022-05-02
Attending: FAMILY MEDICINE
Payer: MEDICARE

## 2022-05-02 DIAGNOSIS — R59.0: ICD-10-CM

## 2022-05-02 DIAGNOSIS — Z79.899: ICD-10-CM

## 2022-05-02 DIAGNOSIS — I10: Primary | ICD-10-CM

## 2022-05-02 LAB
ERYTHROCYTE [DISTWIDTH] IN BLOOD BY AUTOMATED COUNT: 12 % (ref 12–15)
HCT VFR BLD AUTO: 40.5 % (ref 37–47)
HGB UR QL STRIP: 14.1 G/DL (ref 12–16)
MCH RBC QN AUTO: 29.8 PG (ref 27–31)
MCHC RBC AUTO-ENTMCNC: 34.8 G/DL (ref 32–36)
MCV RBC AUTO: 85.6 FL (ref 81–99)
NEUTROPHILS # BLD AUTO: 5 10^3/UL (ref 1.5–6.6)
NEUTROPHILS # SNV AUTO: 7.8 X10^3/UL (ref 4.8–10.8)
NEUTROPHILS NFR BLD AUTO: 63.9 %
PDW BLD AUTO: 8.8 FL (ref 7.9–10.8)
PLATELET # BLD: 248 10^3/UL (ref 130–450)
RBC MAR: 4.73 10^6/UL (ref 4.2–5.4)

## 2022-05-02 PROCEDURE — 85027 COMPLETE CBC AUTOMATED: CPT

## 2022-05-02 PROCEDURE — 36415 COLL VENOUS BLD VENIPUNCTURE: CPT

## 2022-05-12 ENCOUNTER — HOSPITAL ENCOUNTER (OUTPATIENT)
Dept: HOSPITAL 76 - LAB.R | Age: 42
Discharge: HOME | End: 2022-05-12
Attending: STUDENT IN AN ORGANIZED HEALTH CARE EDUCATION/TRAINING PROGRAM
Payer: MEDICARE

## 2022-05-12 DIAGNOSIS — L29.8: ICD-10-CM

## 2022-05-12 DIAGNOSIS — R30.9: Primary | ICD-10-CM

## 2022-05-12 LAB
BV DNA PNL VAG NAA+PROBE: NEGATIVE
C GLABRATA DNA BLD QL NAA+PROBE: NEGATIVE
C KRUSEI DNA VAG QL NAA+PROBE: NEGATIVE
CANDIDA DNA VAG QL NAA+PROBE: NEGATIVE
CLARITY UR REFRACT.AUTO: CLEAR
GLUCOSE UR QL STRIP.AUTO: NEGATIVE MG/DL
KETONES UR QL STRIP.AUTO: NEGATIVE MG/DL
NITRITE UR QL STRIP.AUTO: NEGATIVE
PH UR STRIP.AUTO: 5.5 PH (ref 5–7.5)
PROT UR STRIP.AUTO-MCNC: NEGATIVE MG/DL
RBC # UR STRIP.AUTO: NEGATIVE /UL
RBC # URNS HPF: (no result) /HPF (ref 0–5)
SP GR UR STRIP.AUTO: 1.02 (ref 1–1.03)
SQUAMOUS URNS QL MICRO: (no result)
T VAGINALIS RRNA GENITAL QL PROBE: NEGATIVE
UROBILINOGEN UR QL STRIP.AUTO: (no result) E.U./DL
UROBILINOGEN UR STRIP.AUTO-MCNC: NEGATIVE MG/DL
WBC # UR MANUAL: (no result) /HPF (ref 0–5)

## 2022-05-12 PROCEDURE — 81001 URINALYSIS AUTO W/SCOPE: CPT

## 2022-05-12 PROCEDURE — 81514 NFCT DS BV&VAGINITIS DNA ALG: CPT

## 2022-05-12 PROCEDURE — 87086 URINE CULTURE/COLONY COUNT: CPT

## 2022-06-21 ENCOUNTER — HOSPITAL ENCOUNTER (OUTPATIENT)
Dept: HOSPITAL 76 - DI | Age: 42
Discharge: HOME | End: 2022-06-21
Attending: FAMILY MEDICINE
Payer: MEDICARE

## 2022-06-21 DIAGNOSIS — E04.2: Primary | ICD-10-CM

## 2022-06-21 PROCEDURE — 10005 FNA BX W/US GDN 1ST LES: CPT

## 2022-06-21 NOTE — ULTRASOUND REPORT
PROCEDURE:  FNA Bx w/US Gnd 1st les

 

INDICATIONS:  NONTOXIC MULTINODULAR GOITER

 

TECHNIQUE:  

The indications, alternatives, benefits, risks, and complications of the procedure were explained to 
the patient.  Written informed consent was obtained and placed in the chart.  The area of interest wa
s examined sonographically and a site was chosen for ultrasound guided percutaneous sampling.  The sk
in was prepared and draped in the usual fashion, and anesthetized with 1% lidocaine infiltrated from 
the skin down to the lesion.  Multiple passes were then performed, with contents emptied into an appr
[facility] pathology specimen container.  A bandage was applied to the area of access at completion of t
he study.  

 

COMPARISON:  5/18/2022

 

FINDINGS:  

Location(s) of lesion(s) sampled:  Left inferior thyroid lobe

Needles:  25 gauge hypodermic needles.  

Number of passes:  6

Medications:  1% lidocaine for local anaesthesia.  

Complications:  None.  

 

IMPRESSION:  

Successful ultrasound-guided left thyroid lobe nodule fine needle aspiration, with cytology results p
ending.  

 

Reviewed by: Arturo Subramanian MD on 6/21/2022 3:45 PM PDT

Approved by: Arturo Subramanian MD on 6/21/2022 3:45 PM PDT

 

 

Station ID:  [id number]

## 2022-12-05 ENCOUNTER — HOSPITAL ENCOUNTER (OUTPATIENT)
Dept: HOSPITAL 76 - DI.N | Age: 42
Discharge: HOME | End: 2022-12-05
Attending: PHYSICIAN ASSISTANT
Payer: MEDICARE

## 2022-12-05 DIAGNOSIS — M79.671: Primary | ICD-10-CM

## 2022-12-06 NOTE — XRAY REPORT
PROCEDURE:  Foot 3 View RT

 

INDICATIONS:  R FOOT PX

 

TECHNIQUE:  Three views of the foot were acquired.  

 

COMPARISON:  None.

 

FINDINGS:  

 

No fracture or dislocation. No lytic or blastic osseous lesion. Joint space is maintained.. No acute 
soft tissue findings.

 

IMPRESSION:  

No acute finding or significant degenerative change.

 

Reviewed by: Arturo Subramanian MD on 12/6/2022 5:09 PM [facility]

Approved by: Arturo Subramanian MD on 12/6/2022 5:09 PM [facility]

 

 

Station ID:  [id number]

## 2023-01-13 ENCOUNTER — HOSPITAL ENCOUNTER (EMERGENCY)
Dept: HOSPITAL 76 - ED | Age: 43
LOS: 1 days | Discharge: HOME | End: 2023-01-14
Payer: MEDICARE

## 2023-01-13 DIAGNOSIS — R10.32: Primary | ICD-10-CM

## 2023-01-13 LAB
ALBUMIN DIAFP-MCNC: 4.3 G/DL (ref 3.2–5.5)
ALBUMIN/GLOB SERPL: 1.4 {RATIO} (ref 1–2.2)
ALP SERPL-CCNC: 59 IU/L (ref 42–121)
ALT SERPL W P-5'-P-CCNC: 15 IU/L (ref 10–60)
ANION GAP SERPL CALCULATED.4IONS-SCNC: 10 MMOL/L (ref 6–13)
AST SERPL W P-5'-P-CCNC: 16 IU/L (ref 10–42)
BASOPHILS NFR BLD AUTO: 0 10^3/UL (ref 0–0.1)
BASOPHILS NFR BLD AUTO: 0.6 %
BILIRUB BLD-MCNC: 0.4 MG/DL (ref 0.2–1)
BUN SERPL-MCNC: 11 MG/DL (ref 6–20)
CALCIUM UR-MCNC: 9.3 MG/DL (ref 8.5–10.3)
CHLORIDE SERPL-SCNC: 103 MMOL/L (ref 101–111)
CLARITY UR REFRACT.AUTO: CLEAR
CO2 SERPL-SCNC: 26 MMOL/L (ref 21–32)
CREAT SERPLBLD-SCNC: 0.7 MG/DL (ref 0.4–1)
EOSINOPHIL # BLD AUTO: 0.1 10^3/UL (ref 0–0.7)
EOSINOPHIL NFR BLD AUTO: 1 %
ERYTHROCYTE [DISTWIDTH] IN BLOOD BY AUTOMATED COUNT: 12.3 % (ref 12–15)
GFRSERPLBLD MDRD-ARVRAT: 91 ML/MIN/{1.73_M2} (ref 89–?)
GLOBULIN SER-MCNC: 3.1 G/DL (ref 2.1–4.2)
GLUCOSE SERPL-MCNC: 101 MG/DL (ref 70–100)
GLUCOSE UR QL STRIP.AUTO: NEGATIVE MG/DL
HCG UR QL: NEGATIVE
HCT VFR BLD AUTO: 40.5 % (ref 37–47)
HGB UR QL STRIP: 13.9 G/DL (ref 12–16)
KETONES UR QL STRIP.AUTO: NEGATIVE MG/DL
LIPASE SERPL-CCNC: 23 U/L (ref 22–51)
LYMPHOCYTES # SPEC AUTO: 2.9 10^3/UL (ref 1.5–3.5)
LYMPHOCYTES NFR BLD AUTO: 41.9 %
MCH RBC QN AUTO: 29.6 PG (ref 27–31)
MCHC RBC AUTO-ENTMCNC: 34.3 G/DL (ref 32–36)
MCV RBC AUTO: 86.2 FL (ref 81–99)
MONOCYTES # BLD AUTO: 0.4 10^3/UL (ref 0–1)
MONOCYTES NFR BLD AUTO: 6.1 %
NEUTROPHILS # BLD AUTO: 3.5 10^3/UL (ref 1.5–6.6)
NEUTROPHILS # SNV AUTO: 7 X10^3/UL (ref 4.8–10.8)
NEUTROPHILS NFR BLD AUTO: 50.1 %
NITRITE UR QL STRIP.AUTO: NEGATIVE
NRBC # BLD AUTO: 0 /100WBC
NRBC # BLD AUTO: 0 X10^3/UL
PDW BLD AUTO: 8.8 FL (ref 7.9–10.8)
PH UR STRIP.AUTO: 6.5 PH (ref 5–7.5)
PLATELET # BLD: 272 10^3/UL (ref 130–450)
POTASSIUM SERPL-SCNC: 3.8 MMOL/L (ref 3.5–5)
PROT SPEC-MCNC: 7.4 G/DL (ref 6.7–8.2)
PROT UR STRIP.AUTO-MCNC: NEGATIVE MG/DL
RBC # UR STRIP.AUTO: NEGATIVE /UL
RBC MAR: 4.7 10^6/UL (ref 4.2–5.4)
SODIUM SERPLBLD-SCNC: 139 MMOL/L (ref 135–145)
SP GR UR STRIP.AUTO: 1.02 (ref 1–1.03)
UROBILINOGEN UR QL STRIP.AUTO: (no result) E.U./DL
UROBILINOGEN UR STRIP.AUTO-MCNC: NEGATIVE MG/DL

## 2023-01-13 PROCEDURE — 85025 COMPLETE CBC W/AUTO DIFF WBC: CPT

## 2023-01-13 PROCEDURE — 81025 URINE PREGNANCY TEST: CPT

## 2023-01-13 PROCEDURE — 99284 EMERGENCY DEPT VISIT MOD MDM: CPT

## 2023-01-13 PROCEDURE — 81003 URINALYSIS AUTO W/O SCOPE: CPT

## 2023-01-13 PROCEDURE — 96374 THER/PROPH/DIAG INJ IV PUSH: CPT

## 2023-01-13 PROCEDURE — 81001 URINALYSIS AUTO W/SCOPE: CPT

## 2023-01-13 PROCEDURE — 36415 COLL VENOUS BLD VENIPUNCTURE: CPT

## 2023-01-13 PROCEDURE — 74177 CT ABD & PELVIS W/CONTRAST: CPT

## 2023-01-13 PROCEDURE — 80053 COMPREHEN METABOLIC PANEL: CPT

## 2023-01-13 PROCEDURE — 87086 URINE CULTURE/COLONY COUNT: CPT

## 2023-01-13 PROCEDURE — 83690 ASSAY OF LIPASE: CPT

## 2023-01-13 NOTE — CT REPORT
PROCEDURE:  ABDOMEN/PELVIS W

 

INDICATIONS:  LLQ abdominal pain

 

CONTRAST: 100 ML OMNI 300 

 

TECHNIQUE:  

After the administration of intravenous contrast, 5 mm thick sections acquired from the diaphragms to
 the symphysis.  5 mm thick coronal and sagittal reformats were acquired.  For radiation dose reducti
on, the following was used:  automated exposure control, adjustment of mA and/or kV according to peggy
ent size.  

 

COMPARISON:  CT abdomen pelvis 11/1/2021.

 

FINDINGS:  

Image quality:  Excellent.  

 

Lung bases:There is mild dependent atelectasis.

Heart: Heart is normal in size.

 

ABDOMEN:

Liver: No mass lesion.

Gallbladder: Within normal limits without calcified gallstones.

Biliary ducts: No biliary ductal dilatation.

Pancreas: Unremarkable.

Spleen: Normal in size.

Adrenal Glands: No adrenal nodules.

Kidneys and Ureters: No hydronephrosis.

 

Stomach and Bowel: Stomach and small bowel loops are normal in caliber and wall thickness. There are
 surgical sutures adjacent to the cecum likely reflecting prior appendectomy. No evidence of divertic
ulitis. There is nondistention with segmental mild colonic wall thickening centered at the hepatic fl
exure.

 

Peritoneum: No abnormal intraperitoneal fluid. No free air.

 

Ventral Wall:  No hernia.

Abdominal Nodes: No retroperitoneal or mesenteric adenopathy by size criteria.

Vessels: Aorta and inferior vena cava are normal in size.

 

PELVIS:

Pelvic Organs:An IUD appears in appropriate position, extending to the fundal endometrium.There is 
a right ovarian cyst measuring up to 2.5 cm.

Bladder: Unremarkable.

Pelvic Nodes: No enlarged lymph nodes.

Miscellaneous: No inguinal hernias.

 

Bones: Visualized osseous structures demonstrate no suspicious lesions. 

 

IMPRESSION:  

 

1. No evidence of acute diverticulitis.

 

2. No obstructive uropathy.

 

3. Mild segmental wall thickening of the colon centered at the hepatic flexure reflect a mild colitis
 versus artifact from nondistention.

 

4. Right ovarian cyst likely represents a physiologic cyst.

 

Reviewed by: Charles Melissa MD on 1/14/2023 12:01 AM PST

Approved by: Charles Melissa MD on 1/14/2023 12:01 AM [facility]

 

 

Station ID:  [id number]

## 2023-01-13 NOTE — ED PHYSICIAN DOCUMENTATION
PD HPI BACK PAIN





- Stated complaint


Stated Complaint: BACK PAIN





- History obtained from


History obtained from: Patient





- History of Present Illness


Timing - details: Waxing and waning


Pain level now: 6


Location: Lower, Left


Quality: Pain


Associated symptoms: No: Fever, Weakness, Numbness, Unable to urinate


Recently seen: Clinic





- Additional information


Additional information: 





HPI from patient. Patient states she had developed a "yeast infection" (per 

patient) approximately 1 month ago, the result of a course of antibiotics. She 

was prescribed an anti-fungal but has had recurrent left groin, LLQ pain over 

past few weeks, and the past week it has become increasingly intense and 

radiating to left lower/mid back. She also notes few weeks of fatigue. Her chief

concern is the LLQ abdominal pain that radiates through to her left perilumbar 

area, particularly in light of h/o left renal aneurysm which was addressed 

surgically. 





Review of Systems


Constitutional: denies: Fever


GI: reports: Abdominal Pain, Diarrhea (patient says she has  had diarrhea 

ongoing for several years , has seen "several specialists" (per patient), had 

many tests, and no diagnosis; this is very much a chronic issue and without any 

acute element).  denies: Nausea, Vomiting, Constipation, Hematemesis, Bloody / 

black stool


: denies: Dysuria, Now pregnant EGA


Skin: denies: Rash


Musculoskeletal: reports: Back pain





PD PAST MEDICAL HISTORY





- Past Medical History


Cardiovascular: Arrhythmia


Respiratory: Asthma


Neuro: Head injury, Migraines


Endocrine/Autoimmune: None


GI: Chronic diarrhea, Other


GYN: Ovarian cysts


: Other


HEENT: None


Psych: None


Musculoskeletal: Osteoarthritis, Fibromyalgia


Derm: Psoriasis





- Past Surgical History


Past Surgical History: Yes


General: Appendectomy


Ortho: Arthroscopic surgery


HEENT: Other





- Present Medications


Home Medications: 


                                Ambulatory Orders











 Medication  Instructions  Recorded  Confirmed


 


Albuterol 2.5 mg INH Q4H PRN 09/08/13 01/13/23


 


Cetirizine [ZyrTEC] 10 mg PO DAILY 09/08/13 01/13/23


 


Dicyclomine [Bentyl] 20 mg PO QID 09/08/13 01/13/23


 


Diphenoxylate HCl/Atropine 1 each PO Q4HR 09/08/13 01/13/23





[Lomotil Tablet]   


 


Multivitamin,Ther and Minerals 1 each PO DAILY 09/08/13 01/13/23





[Vitamin and Minerals]   


 


Ondansetron Oral Soln [Zofran] 4 mg PO Q6HR PRN 09/08/13 01/13/23


 


atenoloL [Tenormin] 50 mg PO DAILY 09/08/13 01/13/23


 


Oxycodone HCl/Acetaminophen 1 - 2 each PO Q6H PRN #10 tablet 06/06/14 01/13/23





[Percocet 5-325 mg Tablet]   


 


Gabapentin [Neurontin] 600 mg PO TID 01/13/23 01/13/23


 


LORazepam [Ativan] 0.5 mg PO PRN 01/13/23 


 


methocarbamoL [Methocarbamol] 750 mg PO TID 01/13/23 01/13/23














- Allergies


Allergies/Adverse Reactions: 


                                    Allergies











Allergy/AdvReac Type Severity Reaction Status Date / Time


 


Penicillins Allergy Intermediate Hives Verified 01/13/23 22:01


 


codeine [Codeine] Allergy  Hives Verified 01/13/23 22:01














- Social History


Does the pt smoke?: No


Smoking Status: Never smoker


Does the pt drink ETOH?: No


Does the pt have substance abuse?: No





- Immunizations


Immunizations are current?: Yes





PD ED PE NORMAL





- Vitals


Vital signs reviewed: Yes





- General


General: Alert and oriented X 3, No acute distress, Well developed/nourished





- HEENT


HEENT: Moist mucous membranes





- Cardiac


Cardiac: RRR, No murmur





- Respiratory


Respiratory: No respiratory distress, Clear bilaterally





- Abdomen


Abdomen: Normal bowel sounds, Soft, Non tender, Non distended, No organomegaly





- Back


Back: No CVA TTP





- Derm


Derm: Normal color, Warm and dry, No rash





- Extremities


Extremities: No edema





Results





- Vitals


Vitals: 


                                     Oxygen











O2 Source                      Room air

















- Labs


Labs: 


                                Laboratory Tests











  01/13/23 01/13/23 01/13/23





  22:32 22:43 22:43


 


WBC   7.0 


 


RBC   4.70 


 


Hgb   13.9 


 


Hct   40.5 


 


MCV   86.2 


 


MCH   29.6 


 


MCHC   34.3 


 


RDW   12.3 


 


Plt Count   272 


 


MPV   8.8 


 


Neut # (Auto)   3.5 


 


Lymph # (Auto)   2.9 


 


Mono # (Auto)   0.4 


 


Eos # (Auto)   0.1 


 


Baso # (Auto)   0.0 


 


Absolute Nucleated RBC   0.00 


 


Nucleated RBC %   0.0 


 


Sodium    139


 


Potassium    3.8


 


Chloride    103


 


Carbon Dioxide    26


 


Anion Gap    10.0


 


BUN    11


 


Creatinine    0.7


 


Estimated GFR (MDRD)    91


 


Glucose    101 H


 


Calcium    9.3


 


Total Bilirubin    0.4


 


AST    16


 


ALT    15


 


Alkaline Phosphatase    59


 


Total Protein    7.4


 


Albumin    4.3


 


Globulin    3.1


 


Albumin/Globulin Ratio    1.4


 


Lipase    23


 


Urine Color  YELLOW  


 


Urine Clarity  CLEAR  


 


Urine pH  6.5  


 


Ur Specific Gravity  1.020  


 


Urine Protein  NEGATIVE  


 


Urine Glucose (UA)  NEGATIVE  


 


Urine Ketones  NEGATIVE  


 


Urine Occult Blood  NEGATIVE  


 


Urine Nitrite  NEGATIVE  


 


Urine Bilirubin  NEGATIVE  


 


Urine Urobilinogen  0.2 (NORMAL)  


 


Ur Leukocyte Esterase  NEGATIVE  


 


Ur Microscopic Review  NOT INDICATED  


 


Urine Culture Comments  NOT INDICATED  


 


Urine HCG, Qual  NEGATIVE  














- Rads (name of study)


  ** CT A/P with IV contrast


Radiology: Prelim report reviewed, EMP read indepedently, See rad report





PD Medical Decision Making





- ED course


Complexity details: reviewed old records, reviewed results, re-evaluated 

patient, considered differential, d/w patient


ED course: 





Lab tests ordered, resulted, and results reviewed by me: CBC, ER abdominal 

panel, UA. The results of these tests are all normal (glucose 101). CT A/P with 

IV contrast undertaken; amongst diagnoses considered would be recurrence of 

aneurysm or complication of previously repaired left renal aneurysm. 

Fortunately, no concerning findings  on this CT A/P. There is a questionable 

focus of colonic inflammation RUQ at hepatic flexture vs motion artifact. 

Although she has had diarrhea, as above, this is quite chronic and she says she 

has been worked-up multiple times without diagnosis (including tests for c.diff,

 colonoscopy, etc). Additionally, she is not tender RUQ. Also, WBC is normal , 

suggesting against any inflammatory process. The CT finding is most likely 

artifact (as opposed to focal inflammatory process). I reviewed test results, 

including CT A/P with patient, advised her to follow up with PMD, and return 

precautions discussed . Patient declined analgesics, both in ED as well as rx. 

She is in NAD on reevaluation. She is given IV zofran 4mg and IV fluids (NS) 

early in her stay.





Departure





- Departure


Disposition: 01 Home, Self Care


Clinical Impression: 


Abdominal pain


Qualifiers:


 Abdominal location: left lower quadrant Qualified Code(s): R10.32 - Left lower 

quadrant pain





Condition: Good


Instructions:  ED Abdominal Pain Female Non-Specific Abdominal Pain


Comments: 


The results of the blood tests and urinalysis tonight are all normal.  There are

 no concerning or diagnostic findings on the scan of your abdomen/pelvis.  There

 is a small, right sided ovarian cyst which appears to be a normal cyst (as 

opposed to a cyst that might explain your symptoms).


The cause of your symptoms is not apparent at this time.  Further testing in the

 emergency department is unlikely to yield a diagnosis or change/indicate 

treatment.


Follow-up with your primary care provider next week as scheduled.


You can always return to the emergency department if your symptoms worsen or if 

you develop new/concerning signs/symptoms (such as fever, worsening abdominal 

pain, intractable vomiting, blood in the stool).


Discharge Date/Time: 01/14/23 00:42

## 2023-01-14 VITALS — SYSTOLIC BLOOD PRESSURE: 143 MMHG | DIASTOLIC BLOOD PRESSURE: 74 MMHG

## 2023-05-16 ENCOUNTER — HOSPITAL ENCOUNTER (OUTPATIENT)
Dept: HOSPITAL 76 - DI | Age: 43
Discharge: HOME | End: 2023-05-16
Attending: INTERNAL MEDICINE
Payer: MEDICARE

## 2023-05-16 DIAGNOSIS — R51.9: Primary | ICD-10-CM

## 2023-05-16 NOTE — CT REPORT
PROCEDURE:  HEAD WO

 

INDICATIONS:  HA

 

TECHNIQUE:  

Noncontrast 4.5 mm thick angled axial sections acquired from the foramen magnum to the vertex.  For r
adiation dose reduction, the following was used:  automated exposure control, adjustment of mA and/or
 kV according to patient size.

 

COMPARISON:  None.

 

FINDINGS:  

Image quality:  Excellent.  

 

CSF spaces:  Basal cisterns are patent.  No extra-axial fluid collections.  Ventricles are normal in 
size and shape.  

 

Brain:  No midline shift.  No intracranial masses or hemorrhage.  Gray-white matter interface is norm
al.  

 

Skull and face:  Calvarium and visualized facial bones are intact, without suspicious lesions.  

 

Sinuses:  Visualized sinuses and mastoids are clear.  

 

 

IMPRESSION:  

1.  No acute intracranial process.

 

 

 

Reviewed by: Guillermina Alvarez MD on 5/16/2023 5:14 PM PDT

Approved by: Guillermina Alvarez MD on 5/16/2023 5:14 PM PDT

 

 

Station ID:  [id number]

## 2023-06-29 ENCOUNTER — HOSPITAL ENCOUNTER (EMERGENCY)
Dept: HOSPITAL 76 - ED | Age: 43
Discharge: HOME | End: 2023-06-29
Payer: MEDICARE

## 2023-06-29 ENCOUNTER — HOSPITAL ENCOUNTER (OUTPATIENT)
Dept: HOSPITAL 76 - EMS | Age: 43
Discharge: ANOTHER HOSPITAL | End: 2023-06-29
Payer: MEDICARE

## 2023-06-29 VITALS — SYSTOLIC BLOOD PRESSURE: 155 MMHG | DIASTOLIC BLOOD PRESSURE: 74 MMHG

## 2023-06-29 DIAGNOSIS — G89.29: ICD-10-CM

## 2023-06-29 DIAGNOSIS — R10.30: ICD-10-CM

## 2023-06-29 DIAGNOSIS — K52.9: Primary | ICD-10-CM

## 2023-06-29 DIAGNOSIS — R10.33: Primary | ICD-10-CM

## 2023-06-29 DIAGNOSIS — R11.0: ICD-10-CM

## 2023-06-29 LAB
ALBUMIN DIAFP-MCNC: 4.5 G/DL (ref 3.2–5.5)
ALBUMIN/GLOB SERPL: 1.4 {RATIO} (ref 1–2.2)
ALP SERPL-CCNC: 57 IU/L (ref 42–121)
ALT SERPL W P-5'-P-CCNC: 18 IU/L (ref 10–60)
ANION GAP SERPL CALCULATED.4IONS-SCNC: 7 MMOL/L (ref 6–13)
AST SERPL W P-5'-P-CCNC: 17 IU/L (ref 10–42)
BASOPHILS NFR BLD AUTO: 0 10^3/UL (ref 0–0.1)
BASOPHILS NFR BLD AUTO: 0.3 %
BILIRUB BLD-MCNC: 0.6 MG/DL (ref 0.2–1)
BUN SERPL-MCNC: 17 MG/DL (ref 6–20)
CALCIUM UR-MCNC: 9.2 MG/DL (ref 8.5–10.3)
CHLORIDE SERPL-SCNC: 106 MMOL/L (ref 101–111)
CO2 SERPL-SCNC: 26 MMOL/L (ref 21–32)
CREAT SERPLBLD-SCNC: 0.8 MG/DL (ref 0.4–1)
EOSINOPHIL # BLD AUTO: 0 10^3/UL (ref 0–0.7)
EOSINOPHIL NFR BLD AUTO: 0.3 %
ERYTHROCYTE [DISTWIDTH] IN BLOOD BY AUTOMATED COUNT: 11.9 % (ref 12–15)
GFRSERPLBLD MDRD-ARVRAT: 78 ML/MIN/{1.73_M2} (ref 89–?)
GLOBULIN SER-MCNC: 3.3 G/DL (ref 2.1–4.2)
GLUCOSE SERPL-MCNC: 106 MG/DL (ref 70–100)
HCT VFR BLD AUTO: 41.4 % (ref 37–47)
HGB UR QL STRIP: 14.1 G/DL (ref 12–16)
LIPASE SERPL-CCNC: 57 U/L (ref 22–51)
LYMPHOCYTES # SPEC AUTO: 1.1 10^3/UL (ref 1.5–3.5)
LYMPHOCYTES NFR BLD AUTO: 10.9 %
MAGNESIUM SERPL-MCNC: 1.8 MG/DL (ref 1.7–2.8)
MCH RBC QN AUTO: 30 PG (ref 27–31)
MCHC RBC AUTO-ENTMCNC: 34.1 G/DL (ref 32–36)
MCV RBC AUTO: 88.1 FL (ref 81–99)
MONOCYTES # BLD AUTO: 0.3 10^3/UL (ref 0–1)
MONOCYTES NFR BLD AUTO: 3.1 %
NEUTROPHILS # BLD AUTO: 8.3 10^3/UL (ref 1.5–6.6)
NEUTROPHILS # SNV AUTO: 9.7 X10^3/UL (ref 4.8–10.8)
NEUTROPHILS NFR BLD AUTO: 85.1 %
NRBC # BLD AUTO: 0 /100WBC
NRBC # BLD AUTO: 0 X10^3/UL
PDW BLD AUTO: 9.2 FL (ref 7.9–10.8)
PLATELET # BLD: 218 10^3/UL (ref 130–450)
POTASSIUM SERPL-SCNC: 3.6 MMOL/L (ref 3.5–5)
PROT SPEC-MCNC: 7.8 G/DL (ref 6.7–8.2)
RBC MAR: 4.7 10^6/UL (ref 4.2–5.4)
SODIUM SERPLBLD-SCNC: 139 MMOL/L (ref 135–145)

## 2023-06-29 PROCEDURE — 99284 EMERGENCY DEPT VISIT MOD MDM: CPT

## 2023-06-29 PROCEDURE — 36415 COLL VENOUS BLD VENIPUNCTURE: CPT

## 2023-06-29 PROCEDURE — 83735 ASSAY OF MAGNESIUM: CPT

## 2023-06-29 PROCEDURE — 83690 ASSAY OF LIPASE: CPT

## 2023-06-29 PROCEDURE — 96375 TX/PRO/DX INJ NEW DRUG ADDON: CPT

## 2023-06-29 PROCEDURE — 85025 COMPLETE CBC W/AUTO DIFF WBC: CPT

## 2023-06-29 PROCEDURE — 96374 THER/PROPH/DIAG INJ IV PUSH: CPT

## 2023-06-29 PROCEDURE — 80053 COMPREHEN METABOLIC PANEL: CPT

## 2023-06-29 PROCEDURE — 99285 EMERGENCY DEPT VISIT HI MDM: CPT

## 2023-06-29 RX ADMIN — HYDROMORPHONE HYDROCHLORIDE STA MG: 1 INJECTION, SOLUTION INTRAMUSCULAR; INTRAVENOUS; SUBCUTANEOUS at 12:53

## 2023-06-29 RX ADMIN — PROCHLORPERAZINE EDISYLATE STA MG: 5 INJECTION INTRAMUSCULAR; INTRAVENOUS at 12:53

## 2023-06-29 RX ADMIN — PROCHLORPERAZINE EDISYLATE STA: 5 INJECTION INTRAMUSCULAR; INTRAVENOUS at 12:58

## 2023-06-29 RX ADMIN — HYDROMORPHONE HYDROCHLORIDE STA: 1 INJECTION, SOLUTION INTRAMUSCULAR; INTRAVENOUS; SUBCUTANEOUS at 12:58

## 2023-06-29 NOTE — EXTERNAL MEDICAL SUMMARY RPT
Continuity of Care Document



                            Created on: 2023





LEIGH ANN GHOTRA

External Reference #: [id number]

: 1980

Sex: Female



Demographics





                                        Address             [address] [place] [address] [place]  [address]

 

                                        Phone               Unavailable

 

                                        Preferred Language  English

 

                                        Marital Status      Never 

 

                                        Mormonism Affiliation Unknown

 

                                        Race                Unknown

 

                                        Ethnic Group        Unknown





Author





                                        Name                Unknown

 

                                        [place]  [address]

 

                                        Phone               [phone]

 

                                        Organization        [facility]

 

                                        [place]  [address]

 

                                        Phone               [phone]





Care Team Providers





                                Care Team Member Name Role            Phone

 

                                Nikolai Simms       Unavailable     Unavailable







Allergies and Intolerances





                          date         description  facility     type

 

                          (no date)    [facility] (unknown)







Medications





                                date            description     facility

 

                                2023 00:00 Polymyxin B Sulf-Trimethoprim [facility]







Problems





                                date            description     facility

 

                                2023 00:00 Corneal abrasion [facility]







Results/Labs





                test    date    author  facility value   unit    interpretation

 

                                                    Result panel 1 

 

                    (unknown) (no date) (unknown) (unknown) (no value) (units 

unknown)                                (unknown)

 

                    (unknown) (no date) (unknown) (unknown) [id number]  (units 

unknown)                                (unknown)

 

                    (unknown) (no date) (unknown) (unknown) 23  (units 

unknown)                                (unknown)

 

                    (unknown) (no date) (unknown) (unknown) 14:11     (units 

unknown)                                (unknown)

 

                    (unknown) (no date) (unknown) (unknown) Age/Sex: 42 / F (uni

ts 

unknown)                                (unknown)

 

                      (unknown)  (no date)  (unknown)  (unknown)  Blood Pressure

 

142/107 H 23 

14:11                                   (units 

unknown)                                (unknown)

 

                      (unknown)  (no date)  (unknown)  (unknown)  Blood Pressure

 

142/107 H                               (units 

unknown)                                (unknown)

 

                      (unknown)  (no date)  (unknown)  (unknown)  Chief complain

t: 

Eye Problems                            (units 

unknown)                                (unknown)

 

                    (unknown) (no date) (unknown) (unknown) Course    (units 

unknown)                                (unknown)

 

                      (unknown)  (no date)  (unknown)  (unknown)  : 

8 

Acct:[id number]                         (units 

unknown)                                (unknown)

 

                      (unknown)  (no date)  (unknown)  (unknown)  Date of Servic

e: 

23                                (units 

unknown)                                (unknown)

 

                    (unknown) (no date) (unknown) (unknown) Departure (units 

unknown)                                (unknown)

 

                    (unknown) (no date) (unknown) (unknown) Discharge Plan (unit

s 

unknown)                                (unknown)

 

                      (unknown)  (no date)  (unknown)  (unknown)  Discontinued 

Medications                             (units 

unknown)                                (unknown)

 

                      (unknown)  (no date)  (unknown)  (unknown)  ER Physician: 

Angelia Alberts P.A-C                                   (units 

unknown)                                (unknown)

 

                    (unknown) (no date) (unknown) (unknown) Emergency Report (un

its 

unknown)                                (unknown)

 

                    (unknown) (no date) (unknown) (unknown) Exam      (units 

unknown)                                (unknown)

 

                      (unknown)  (no date)  (unknown)  (unknown)  Fluorescein So

dium 

(Fluorescein 1 Mg 

Strip) 1 mg 

EYE-BOTH NOW ONE                        (units 

unknown)                                (unknown)

 

                    (unknown) (no date) (unknown) (unknown) General   (units 

unknown)                                (unknown)

 

                    (unknown) (no date) (unknown) (unknown) HPI - Eye Problem (u

nits 

unknown)                                (unknown)

 

                    (unknown) (no date) (unknown) (unknown) Initial Vital Signs 

(units 

unknown)                                (unknown)

 

                      (unknown)  (no date)  (unknown)  (unknown)  Initial Vital 

Signs:                                  (units 

unknown)                                (unknown)

 

                      (unknown)  (no date)  (unknown)  (unknown)  [address] [address]                     (units 

unknown)                                (unknown)

 

                      (unknown)  (no date)  (unknown)  (unknown)  Nikolai Simms MD

 

[Primary Care 

Provider]                               (units 

unknown)                                (unknown)

 

                    (unknown) (no date) (unknown) (unknown) Ordered:  (units 

unknown)                                (unknown)

 

                    (unknown) (no date) (unknown) (unknown) Orders    (units 

unknown)                                (unknown)

 

                      (unknown)  (no date)  (unknown)  (unknown)  Oxygen Deliver

y 

[facility] Room Air 

23 14:11                          (units 

unknown)                                (unknown)

 

                      (unknown)  (no date)  (unknown)  (unknown)  Oxygen Deliver

y 

Method Room Air                         (units 

unknown)                                (unknown)

 

                      (unknown)  (no date)  (unknown)  (unknown)  Patient: 

Leigh Ann Ghotra 

MR#: [id number]                                 (units 

unknown)                                (unknown)

 

                      (unknown)  (no date)  (unknown)  (unknown)  Proparacaine H

Cl 

(Proparacaine 0.5% 

Ophth Sol) 1 drops 

EYE-BOTH NOW ONE                        (units 

unknown)                                (unknown)

 

                      (unknown)  (no date)  (unknown)  (unknown)  Pulse Oximetry

 98 

23 14:11                          (units 

unknown)                                (unknown)

 

                    (unknown) (no date) (unknown) (unknown) Pulse Oximetry 98 (u

nits 

unknown)                                (unknown)

 

                      (unknown)  (no date)  (unknown)  (unknown)  Pulse Rate 81 

23 14:11                          (units 

unknown)                                (unknown)

 

                    (unknown) (no date) (unknown) (unknown) Pulse Rate 81 (units

 

unknown)                                (unknown)

 

                    (unknown) (no date) (unknown) (unknown) Referrals: (units 

unknown)                                (unknown)

 

                      (unknown)  (no date)  (unknown)  (unknown)  Respiratory Ra

te 16 

23 14:11                          (units 

unknown)                                (unknown)

 

                    (unknown) (no date) (unknown) (unknown) Respiratory Rate 16 

(units 

unknown)                                (unknown)

 

                    (unknown) (no date) (unknown) (unknown) Signed By: (units 

unknown)                                (unknown)

 

                      (unknown)  (no date)  (unknown)  (unknown)  Stated complai

nt: 

lt eye pain/injury                      (units 

unknown)                                (unknown)

 

                      (unknown)  (no date)  (unknown)  (unknown)  Stop: 23

 

17:36                                   (units 

unknown)                                (unknown)

 

                      (unknown)  (no date)  (unknown)  (unknown)  Temperature 98

.4 F 

23 14:11                          (units 

unknown)                                (unknown)

 

                    (unknown) (no date) (unknown) (unknown) Temperature 98.4 F (

units 

unknown)                                (unknown)

 

                      (unknown)  (no date)  (unknown)  (unknown)  Time Seen by 

Provider: 23 

16:40                                   (units 

unknown)                                (unknown)

 

                    (unknown) (no date) (unknown) (unknown) Vital Signs - 8 hr (

units 

unknown)                                (unknown)

 

                    (unknown) (no date) (unknown) (unknown) Vital Signs (units 

unknown)                                (unknown)

 

                    (unknown) (no date) (unknown) (unknown) Vital signs: (units 

unknown)                                (unknown)

 

                                                    Result panel 2 

 

                    (unknown) (no date) (unknown) (unknown) (no value) (units 

unknown)                                (unknown)

 

                      (unknown)  (no date)  (unknown)  (unknown)  <Electronicall

y 

signed by Angelia Alberts>                          (units 

unknown)                                (unknown)

 

                    (unknown) (no date) (unknown) (unknown) **Misc Procedure (un

its 

unknown)                                (unknown)

 

                      (unknown)  (no date)  (unknown)  (unknown)  *If you do not

 have 

a primary care 

provider please 

contact the [facility]                                (units 

unknown)                                (unknown)

 

                      (unknown)  (no date)  (unknown)  (unknown)  *Please contin

ue to 

take your regular 

medications as 

directed.                               (units 

unknown)                                (unknown)

 

                      (unknown)  (no date)  (unknown)  (unknown)  *Please follow

 up 

with your primary 

care provider in 

2-3 days, call for 

an                                      (units 

unknown)                                (unknown)

 

                      (unknown)  (no date)  (unknown)  (unknown)  *Return to 

Emergency 

Department if you 

should have any 

new, worsening or                       (units 

unknown)                                (unknown)

 

                    (unknown) (no date) (unknown) (unknown) *What to do: (units 

unknown)                                (unknown)

 

                      (unknown)  (no date)  (unknown)  (unknown)  *You have been

 

diagnosed with 

corneal abrasion                        (units 

unknown)                                (unknown)

 

                    (unknown) (no date) (unknown) (unknown) [id number]  (units 

unknown)                                (unknown)

 

                    (unknown) (no date) (unknown) (unknown) 23 1928 (units

 

unknown)                                (unknown)

 

                    (unknown) (no date) (unknown) (unknown) 23  (units 

unknown)                                (unknown)

 

                      (unknown)  (no date)  (unknown)  (unknown)  1 drp EYE-LEFT

 QID 

7 Days Qty: 10 0RF                      (units 

unknown)                                (unknown)

 

                    (unknown) (no date) (unknown) (unknown) 14:11     (units 

unknown)                                (unknown)

 

                      (unknown)  (no date)  (unknown)  (unknown)  42-year-old wo

man 

presents with 

concern for left 

eye pain after she 

was poked in                            (units 

unknown)                                (unknown)

 

                      (unknown)  (no date)  (unknown)  (unknown)  Activity 

Restrictions/Additi

onal Instructions:                      (units 

unknown)                                (unknown)

 

                      (unknown)  (no date)  (unknown)  (unknown)  After instilla

tion 

of 2 drops of 

proparacaine, 

fluorescein strip 

was used to                             (units 

unknown)                                (unknown)

 

                    (unknown) (no date) (unknown) (unknown) Age/Sex: 42 / F (uni

ts 

unknown)                                (unknown)

 

                    (unknown) (no date) (unknown) (unknown) Allergies (units 

unknown)                                (unknown)

 

                      (unknown)  (no date)  (unknown)  (unknown)  Allergy/AdvRea

c 

Type Severity 

Reaction Status 

Date / Time                             (units 

unknown)                                (unknown)

 

                      (unknown)  (no date)  (unknown)  (unknown)  Blood Pressure

 

142/107 H 23 

14:11                                   (units 

unknown)                                (unknown)

 

                      (unknown)  (no date)  (unknown)  (unknown)  Blood Pressure

 

142/107 H                               (units 

unknown)                                (unknown)

 

                    (unknown) (no date) (unknown) (unknown) Breathing (units 

unknown)                                (unknown)

 

                      (unknown)  (no date)  (unknown)  (unknown)  CARDIOVASCULAR

: 

Regular rate and 

rhythm without 

murmurs, gallops, 

or rubs.                                (units 

unknown)                                (unknown)

 

                      (unknown)  (no date)  (unknown)  (unknown)  Chief complain

t: 

Eye Problems                            (units 

unknown)                                (unknown)

 

                      (unknown)  (no date)  (unknown)  (unknown)  Clinical 

Impression:                             (units 

unknown)                                (unknown)

 

                    (unknown) (no date) (unknown) (unknown) Complications: none 

(units 

unknown)                                (unknown)

 

                    (unknown) (no date) (unknown) (unknown) Corneal abrasion (un

its 

unknown)                                (unknown)

 

                    (unknown) (no date) (unknown) (unknown) Course    (units 

unknown)                                (unknown)

 

                      (unknown)  (no date)  (unknown)  (unknown)  : 

8 

Acct:[id number]                         (units 

unknown)                                (unknown)

 

                      (unknown)  (no date)  (unknown)  (unknown)  Date of Servic

e: 

23                                (units 

unknown)                                (unknown)

 

                    (unknown) (no date) (unknown) (unknown) Departure (units 

unknown)                                (unknown)

 

                      (unknown)  (no date)  (unknown)  (unknown)  Differential 

Diagnosis                               (units 

unknown)                                (unknown)

 

                      (unknown)  (no date)  (unknown)  (unknown)  Differential 

diagnosis: Likely 

other (corneal 

abrasion)                               (units 

unknown)                                (unknown)

 

                    (unknown) (no date) (unknown) (unknown) Discharge Plan (unit

s 

unknown)                                (unknown)

 

                      (unknown)  (no date)  (unknown)  (unknown)  Discontinued 

Medications                             (units 

unknown)                                (unknown)

 

                    (unknown) (no date) (unknown) (unknown) Documented By: [facility] (

units 

unknown)                                (unknown)

 

                      (unknown)  (no date)  (unknown)  (unknown)  ENT: Nose with

out 

bleeding, purulent 

drainage. Throat 

without erythema,                       (units 

unknown)                                (unknown)

 

                      (unknown)  (no date)  (unknown)  (unknown)  ER Physician: 

Angelia Alberts P.A-C                                   (units 

unknown)                                (unknown)

 

                      (unknown)  (no date)  (unknown)  (unknown)  EXTREMITIES: N

o 

edema or joint 

tenderness.                             (units 

unknown)                                (unknown)

 

                      (unknown)  (no date)  (unknown)  (unknown)  EYES: Pupils e

qual 

round and reactive. 

Extraocular motions 

intact. No scleral                      (units 

unknown)                                (unknown)

 

                    (unknown) (no date) (unknown) (unknown) Emergency Report (un

its 

unknown)                                (unknown)

 

                    (unknown) (no date) (unknown) (unknown) Exam Narrative: (uni

ts 

unknown)                                (unknown)

 

                    (unknown) (no date) (unknown) (unknown) Exam      (units 

unknown)                                (unknown)

 

                      (unknown)  (no date)  (unknown)  (unknown)  Fluorescein So

dium 

(Fluorescein 1 Mg 

Strip) 1 mg 

EYE-BOTH NOW ONE                        (units 

unknown)                                (unknown)

 

                      (unknown)  (no date)  (unknown)  (unknown)  Fluorescein ey

e 

exam                                    (units 

unknown)                                (unknown)

 

                      (unknown)  (no date)  (unknown)  (unknown)  GENERAL: 42 ye

ar 

old patient appears 

stated age. 

Well-developed 

patient, in mild                        (units 

unknown)                                (unknown)

 

                    (unknown) (no date) (unknown) (unknown) General   (units 

unknown)                                (unknown)

 

                      (unknown)  (no date)  (unknown)  (unknown)  HEAD: Atraumat

ic. 

Normocephalic.                          (units 

unknown)                                (unknown)

 

                    (unknown) (no date) (unknown) (unknown) HPI - Eye Problem (u

nits 

unknown)                                (unknown)

 

                    (unknown) (no date) (unknown) (unknown) HPI Narrative: (unit

s 

unknown)                                (unknown)

 

                      (unknown)  (no date)  (unknown)  (unknown)  History of Pre

sent 

Illness                                 (units 

unknown)                                (unknown)

 

                    (unknown) (no date) (unknown) (unknown) Initial Vital Signs 

(units 

unknown)                                (unknown)

 

                      (unknown)  (no date)  (unknown)  (unknown)  Initial Vital 

Signs:                                  (units 

unknown)                                (unknown)

 

                      (unknown)  (no date)  (unknown)  (unknown)  [address] [address]                     (units 

unknown)                                (unknown)

 

                      (unknown)  (no date)  (unknown)  (unknown)  Nikolai Simms MD

 

[Primary Care 

Provider]                               (units 

unknown)                                (unknown)

 

                      (unknown)  (no date)  (unknown)  (unknown)  Last Admin: 

23 18:39 

Dose: 1 drop                            (units 

unknown)                                (unknown)

 

                      (unknown)  (no date)  (unknown)  (unknown)  Last Admin: 

23 18:40 

Dose: 1 mg                              (units 

unknown)                                (unknown)

 

                    (unknown) (no date) (unknown) (unknown) Left eye  (units 

unknown)                                (unknown)

 

                    (unknown) (no date) (unknown) (unknown) Location: (units 

unknown)                                (unknown)

 

                    (unknown) (no date) (unknown) (unknown) MDM - Eye Problem (u

nits 

unknown)                                (unknown)

 

                    (unknown) (no date) (unknown) (unknown) MDM Narrative (units

 

unknown)                                (unknown)

 

                      (unknown)  (no date)  (unknown)  (unknown)  Medical decisi

on 

making narrative:                       (units 

unknown)                                (unknown)

 

                      (unknown)  (no date)  (unknown)  (unknown)  Medication 

Instructions 

Recorded                                (units 

unknown)                                (unknown)

 

                      (unknown)  (no date)  (unknown)  (unknown)  NECK: Trachea 

midline. Non tender                     (units 

unknown)                                (unknown)

 

                    (unknown) (no date) (unknown) (unknown) NEURO: AOx3. (units 

unknown)                                (unknown)

 

                    (unknown) (no date) (unknown) (unknown) Name of Procedure: (

units 

unknown)                                (unknown)

 

                    (unknown) (no date) (unknown) (unknown) Narrative (units 

unknown)                                (unknown)

 

                    (unknown) (no date) (unknown) (unknown) Narrative: (units 

unknown)                                (unknown)

 

                    (unknown) (no date) (unknown) (unknown) New       (units 

unknown)                                (unknown)

 

                    (unknown) (no date) (unknown) (unknown) Ordered:  (units 

unknown)                                (unknown)

 

                    (unknown) (no date) (unknown) (unknown) Orders    (units 

unknown)                                (unknown)

 

                      (unknown)  (no date)  (unknown)  (unknown)  Oxygen Deliver

y 

[facility] Room Air 

23 14:11                          (units 

unknown)                                (unknown)

 

                      (unknown)  (no date)  (unknown)  (unknown)  Oxygen Deliver

y 

[facility] Room Air                         (units 

unknown)                                (unknown)

 

                      (unknown)  (no date)  (unknown)  (unknown)  Patient 

Disposition: Home                       (units 

unknown)                                (unknown)

 

                      (unknown)  (no date)  (unknown)  (unknown)  Patient tolera

ricarda 

procedure: Well                         (units 

unknown)                                (unknown)

 

                      (unknown)  (no date)  (unknown)  (unknown)  Patient: 

Leigh Ann Ghotra 

MR#: [id number]                                 (units 

unknown)                                (unknown)

 

                      (unknown)  (no date)  (unknown)  (unknown)  Penicillins Al

lergy 

Severe Difficulty 

Verified 23 

18:41                                   (units 

unknown)                                (unknown)

 

                    (unknown) (no date) (unknown) (unknown) Prescriptions: (unit

s 

unknown)                                (unknown)

 

                    (unknown) (no date) (unknown) (unknown) Previous Rx's (units

 

unknown)                                (unknown)

 

                    (unknown) (no date) (unknown) (unknown) Procedures (units 

unknown)                                (unknown)

 

                      (unknown)  (no date)  (unknown)  (unknown)  Proparacaine H

Cl 

(Proparacaine 0.5% 

Ophth Sol) 1 drops 

EYE-BOTH NOW ONE                        (units 

unknown)                                (unknown)

 

                      (unknown)  (no date)  (unknown)  (unknown)  Pulse Oximetry

 98 

23 14:11                          (units 

unknown)                                (unknown)

 

                    (unknown) (no date) (unknown) (unknown) Pulse Oximetry 98 (u

nits 

unknown)                                (unknown)

 

                      (unknown)  (no date)  (unknown)  (unknown)  Pulse Rate 81 

23 14:11                          (units 

unknown)                                (unknown)

 

                    (unknown) (no date) (unknown) (unknown) Pulse Rate 81 (units

 

unknown)                                (unknown)

 

                      (unknown)  (no date)  (unknown)  (unknown)  RESPIRATORY: C

lear 

to auscultation. 

Breath sounds equal 

bilaterally. No 

wheezes,                                (units 

unknown)                                (unknown)

 

                    (unknown) (no date) (unknown) (unknown) Referrals: (units 

unknown)                                (unknown)

 

                    (unknown) (no date) (unknown) (unknown) Related Data (units 

unknown)                                (unknown)

 

                      (unknown)  (no date)  (unknown)  (unknown)  Resource line 

at 

[phone]. They 

will ask some 

questions about 

your medical                            (units 

unknown)                                (unknown)

 

                      (unknown)  (no date)  (unknown)  (unknown)  Respiratory Ra

te 16 

23 14:11                          (units 

unknown)                                (unknown)

 

                    (unknown) (no date) (unknown) (unknown) Respiratory Rate 16 

(units 

unknown)                                (unknown)

 

                    (unknown) (no date) (unknown) (unknown) Review of Systems (u

nits 

unknown)                                (unknown)

 

                      (unknown)  (no date)  (unknown)  (unknown)  SKIN: No rash 

or 

erythema of visible 

areas                                   (units 

unknown)                                (unknown)

 

                    (unknown) (no date) (unknown) (unknown) See HPI   (units 

unknown)                                (unknown)

 

                      (unknown)  (no date)  (unknown)  (unknown)  Shared decisio

n 

making:: Shared 

decision-making was 

used and determine 

the                                     (units 

unknown)                                (unknown)

 

                    (unknown) (no date) (unknown) (unknown) Signed By: (units 

unknown)                                (unknown)

 

                      (unknown)  (no date)  (unknown)  (unknown)  Stand Alone Fo

lior: 

Patient Portal/API                      (units 

unknown)                                (unknown)

 

                      (unknown)  (no date)  (unknown)  (unknown)  Stated complai

nt: 

lt eye pain/injury                      (units 

unknown)                                (unknown)

 

                      (unknown)  (no date)  (unknown)  (unknown)  Stop: 23

 

17:36                                   (units 

unknown)                                (unknown)

 

                      (unknown)  (no date)  (unknown)  (unknown)  Technique/Desc

ripti

on of procedure 

performed:                              (units 

unknown)                                (unknown)

 

                      (unknown)  (no date)  (unknown)  (unknown)  Temperature 98

.4 F 

23 14:11                          (units 

unknown)                                (unknown)

 

                    (unknown) (no date) (unknown) (unknown) Temperature 98.4 F (

units 

unknown)                                (unknown)

 

                      (unknown)  (no date)  (unknown)  (unknown)  This is a 

well-appearing but 

anxious 42-year-old 

woman who presents 

with concern                            (units 

unknown)                                (unknown)

 

                      (unknown)  (no date)  (unknown)  (unknown)  Time Seen by 

Provider: 23 

16:40                                   (units 

unknown)                                (unknown)

 

                      (unknown)  (no date)  (unknown)  (unknown)  Treatment and 

disposition                             (units 

unknown)                                (unknown)

 

                    (unknown) (no date) (unknown) (unknown) Vital Signs - 8 hr (

units 

unknown)                                (unknown)

 

                    (unknown) (no date) (unknown) (unknown) Vital Signs (units 

unknown)                                (unknown)

 

                    (unknown) (no date) (unknown) (unknown) Vital signs: (units 

unknown)                                (unknown)

 

                      (unknown)  (no date)  (unknown)  (unknown)  [ ] New medica

tion 

written as a paper 

prescription                            (units 

unknown)                                (unknown)

 

                      (unknown)  (no date)  (unknown)  (unknown)  [ ] No new 

medications given                       (units 

unknown)                                (unknown)

 

                      (unknown)  (no date)  (unknown)  (unknown)  [1 ] New medic

ation 

prescriptions sent 

to your pharmacy: 

[Antibiotic                             (units 

unknown)                                (unknown)

 

                      (unknown)  (no date)  (unknown)  (unknown)  abrasion noted

 at 

the 9 o'clock 

position. Patient 

is placed on 

antibiotic                              (units 

unknown)                                (unknown)

 

                      (unknown)  (no date)  (unknown)  (unknown)  and ED precaut

ions 

provided, follow-up 

plan discussed, all 

questions answered.                     (units 

unknown)                                (unknown)

 

                      (unknown)  (no date)  (unknown)  (unknown)  appointment. L

et 

them know you were 

seen in the 

Emergency 

Department and that 

we                                      (units 

unknown)                                (unknown)

 

                      (unknown)  (no date)  (unknown)  (unknown)  area. She is 

worried she has a 

corneal abrasion. 

She is unsure she 

is had any                              (units 

unknown)                                (unknown)

 

                      (unknown)  (no date)  (unknown)  (unknown)  ask that you b

e 

seen in follow up. 

We will 

electronically 

transmit a record 

of                                      (units 

unknown)                                (unknown)

 

                      (unknown)  (no date)  (unknown)  (unknown)  been having pa

in 

under her eyelid 

with blinking and 

with her eye closed 

when her                                (units 

unknown)                                (unknown)

 

                      (unknown)  (no date)  (unknown)  (unknown)  concerning 

symptoms, such as 

[fever greater than 

101 F, shaking 

chills,                                 (units 

unknown)                                (unknown)

 

                    (unknown) (no date) (unknown) (unknown) corneal abrasion (un

its 

unknown)                                (unknown)

 

                      (unknown)  (no date)  (unknown)  (unknown)  discharge from

 her 

eye she thinks she 

is had a little bit 

of blurriness in 

her                                     (units 

unknown)                                (unknown)

 

                      (unknown)  (no date)  (unknown)  (unknown)  distress, obes

e, 

anxious appearing.                      (units 

unknown)                                (unknown)

 

                      (unknown)  (no date)  (unknown)  (unknown)  drops, advised

 to 

follow up with 

Optometry or 

Ophthalmology, 

return precautions                      (units 

unknown)                                (unknown)

 

                      (unknown)  (no date)  (unknown)  (unknown)  eye moves arou

nd. 

On exam patient 

does have very 

slight swelling and                     (units 

unknown)                                (unknown)

 

                      (unknown)  (no date)  (unknown)  (unknown)  eyedrops polym

yxin 

B sulf 

trimethoprim]                           (units 

unknown)                                (unknown)

 

                      (unknown)  (no date)  (unknown)  (unknown)  fluorescein ex

am 

there is a small 

corneal abrasion at 

approximately the 9                     (units 

unknown)                                (unknown)

 

                    (unknown) (no date) (unknown) (unknown) follow-up. (units 

unknown)                                (unknown)

 

                      (unknown)  (no date)  (unknown)  (unknown)  for possible 

corneal abrasion to 

her left eye 

sustained 3 days 

ago. Patient has                        (units 

unknown)                                (unknown)

 

                      (unknown)  (no date)  (unknown)  (unknown)  history and he

lp 

get you set up with 

a doctor in the 

community.                              (units 

unknown)                                (unknown)

 

                      (unknown)  (no date)  (unknown)  (unknown)  icterus. There

 is 

very slight 

injection of the 

left eye, without 

drainage. On                            (units 

unknown)                                (unknown)

 

                      (unknown)  (no date)  (unknown)  (unknown)  inflammation t

o the 

upper eyelid, on 

fluorescein exam 

she has a small 

corneal                                 (units 

unknown)                                (unknown)

 

                      (unknown)  (no date)  (unknown)  (unknown)  instill dye in

 the 

left eye, black 

light exam under 

magnification 

reveals small                           (units 

unknown)                                (unknown)

 

                      (unknown)  (no date)  (unknown)  (unknown)  like there is 

slight bruising of 

her upper eyelid 

and it feels tender 

in the                                  (units 

unknown)                                (unknown)

 

                      (unknown)  (no date)  (unknown)  (unknown)  o'clock positi

on on 

the left cornea.                        (units 

unknown)                                (unknown)

 

                    (unknown) (no date) (unknown) (unknown) pain with movement. 

(units 

unknown)                                (unknown)

 

                      (unknown)  (no date)  (unknown)  (unknown)  patient's plan

 of 

care in the 

emergency 

department and plan 

for outpatient                          (units 

unknown)                                (unknown)

 

                      (unknown)  (no date)  (unknown)  (unknown)  persistent 

symptoms.                               (units 

unknown)                                (unknown)

 

                      (unknown)  (no date)  (unknown)  (unknown)  polymyxin B 

sulf-trimethoprim 

10,000 unit- 1 

mg/mL drops                             (units 

unknown)                                (unknown)

 

                      (unknown)  (no date)  (unknown)  (unknown)  polymyxin B redd

lfate 

10,000 1 drp 

EYE-LEFT QID 

corneal 23                        (units 

unknown)                                (unknown)

 

                    (unknown) (no date) (unknown) (unknown) rales, or rhonchi. (

units 

unknown)                                (unknown)

 

                      (unknown)  (no date)  (unknown)  (unknown)  symptoms, I 

recommend follow up 

with Optometry or 

Ophthalmology if 

you have                                (units 

unknown)                                (unknown)

 

                      (unknown)  (no date)  (unknown)  (unknown)  the eye by carrie hawley 3 

days ago. Patient 

states that she is 

been having pain 

with                                    (units 

unknown)                                (unknown)

 

                      (unknown)  (no date)  (unknown)  (unknown)  today's note i

f 

your PCP is in our 

system. Please 

monitor for new or 

worsening                               (units 

unknown)                                (unknown)

 

                      (unknown)  (no date)  (unknown)  (unknown)  tonsillar 

hypertrophy or 

exudate. Airway 

patent.                                 (units 

unknown)                                (unknown)

 

                      (unknown)  (no date)  (unknown)  (unknown)  unit-trimethop

rim 1 

mg/mL eye drops 

abrasion 7 days #10 

mL                                      (units 

unknown)                                (unknown)

 

                      (unknown)  (no date)  (unknown)  (unknown)  vision. She de

nies 

any other symptoms 

or complaints 

including 

headaches, eye                          (units 

unknown)                                (unknown)

 

                      (unknown)  (no date)  (unknown)  (unknown)  when she blink

s and 

when she has her 

eyes closed and 

moves around, she 

feels                                   (units 

unknown)                                (unknown)

 

                      (unknown)  (no date)  (unknown)  (unknown)  worsening pain

, 

persistent vomiting 

or other bothersome 

symptoms]                               (units 

unknown)                                (unknown)

 

                                                    Result panel 3 

 

                    (unknown) (no date) (unknown) (unknown) (no value) (units 

unknown)                                (unknown)

 

                      (unknown)  (no date)  (unknown)  (unknown)  <Electronicall

y 

signed by Marita WINKLEROBriana>                           (units 

unknown)                                (unknown)

 

                      (unknown)  (no date)  (unknown)  (unknown)  <Electronicall

y 

signed by CATHI ColemanOBriana>                          (units 

unknown)                                (unknown)

 

                      (unknown)  (no date)  (unknown)  (unknown)  <Electronicall

y 

signed by Angelia Alberts>                          (units 

unknown)                                (unknown)

 

                      (unknown)  (no date)  (unknown)  (unknown)  <Marita lim DO 

- Last Filed: 

23 06:48>                         (units 

unknown)                                (unknown)

 

                      (unknown)  (no date)  (unknown)  (unknown)  <Angelia baig PA-C - Last Filed: 

23 19:28>                         (units 

unknown)                                (unknown)

 

                    (unknown) (no date) (unknown) (unknown) <cosigner> (units 

unknown)                                (unknown)

 

                    (unknown) (no date) (unknown) (unknown) **Misc Procedure (un

its 

unknown)                                (unknown)

 

                      (unknown)  (no date)  (unknown)  (unknown)  *If you do not

 have 

a primary care 

provider please 

contact the [facility]                                (units 

unknown)                                (unknown)

 

                      (unknown)  (no date)  (unknown)  (unknown)  *Please contin

ue to 

take your regular 

medications as 

directed.                               (units 

unknown)                                (unknown)

 

                      (unknown)  (no date)  (unknown)  (unknown)  *Please follow

 up 

with your primary 

care provider in 

2-3 days, call for 

an                                      (units 

unknown)                                (unknown)

 

                      (unknown)  (no date)  (unknown)  (unknown)  *Return to 

Emergency 

Department if you 

should have any 

new, worsening or                       (units 

unknown)                                (unknown)

 

                    (unknown) (no date) (unknown) (unknown) *What to do: (units 

unknown)                                (unknown)

 

                      (unknown)  (no date)  (unknown)  (unknown)  *You have been

 

diagnosed with 

corneal abrasion                        (units 

unknown)                                (unknown)

 

                    (unknown) (no date) (unknown) (unknown) [id number]  (units 

unknown)                                (unknown)

 

                    (unknown) (no date) (unknown) (unknown) 23 1928 (units

 

unknown)                                (unknown)

 

                    (unknown) (no date) (unknown) (unknown) 23  (units 

unknown)                                (unknown)

 

                    (unknown) (no date) (unknown) (unknown) 23 0648 (units

 

unknown)                                (unknown)

 

                      (unknown)  (no date)  (unknown)  (unknown)  1 drp EYE-LEFT

 QID 

7 Days Qty: 10 0RF                      (units 

unknown)                                (unknown)

 

                    (unknown) (no date) (unknown) (unknown) 14:11     (units 

unknown)                                (unknown)

 

                      (unknown)  (no date)  (unknown)  (unknown)  42-year-old wo

man 

presents with 

concern for left 

eye pain after she 

was poked in                            (units 

unknown)                                (unknown)

 

                      (unknown)  (no date)  (unknown)  (unknown)  Activity 

Restrictions/Additi

onal Instructions:                      (units 

unknown)                                (unknown)

 

                      (unknown)  (no date)  (unknown)  (unknown)  After instilla

tion 

of 2 drops of 

proparacaine, 

fluorescein strip 

was used to                             (units 

unknown)                                (unknown)

 

                    (unknown) (no date) (unknown) (unknown) Age/Sex: 42 / F (uni

ts 

unknown)                                (unknown)

 

                    (unknown) (no date) (unknown) (unknown) Allergies (units 

unknown)                                (unknown)

 

                      (unknown)  (no date)  (unknown)  (unknown)  Allergy/AdvRea

c 

Type Severity 

Reaction Status 

Date / Time                             (units 

unknown)                                (unknown)

 

                      (unknown)  (no date)  (unknown)  (unknown)  Blood Pressure

 

142/107 H 23 

14:11                                   (units 

unknown)                                (unknown)

 

                      (unknown)  (no date)  (unknown)  (unknown)  Blood Pressure

 

142/107 H                               (units 

unknown)                                (unknown)

 

                    (unknown) (no date) (unknown) (unknown) Breathing (units 

unknown)                                (unknown)

 

                      (unknown)  (no date)  (unknown)  (unknown)  CARDIOVASCULAR

: 

Regular rate and 

rhythm without 

murmurs, gallops, 

or rubs.                                (units 

unknown)                                (unknown)

 

                      (unknown)  (no date)  (unknown)  (unknown)  Chief complain

t: 

Eye Problems                            (units 

unknown)                                (unknown)

 

                      (unknown)  (no date)  (unknown)  (unknown)  Clinical 

Impression:                             (units 

unknown)                                (unknown)

 

                    (unknown) (no date) (unknown) (unknown) Complications: none 

(units 

unknown)                                (unknown)

 

                    (unknown) (no date) (unknown) (unknown) Corneal abrasion (un

its 

unknown)                                (unknown)

 

                    (unknown) (no date) (unknown) (unknown) Cosign    (units 

unknown)                                (unknown)

 

                    (unknown) (no date) (unknown) (unknown) Course    (units 

unknown)                                (unknown)

 

                      (unknown)  (no date)  (unknown)  (unknown)  : 

8 

Acct:[id number]                         (units 

unknown)                                (unknown)

 

                      (unknown)  (no date)  (unknown)  (unknown)  Date of Servic

e: 

23                                (units 

unknown)                                (unknown)

 

                    (unknown) (no date) (unknown) (unknown) Departure (units 

unknown)                                (unknown)

 

                      (unknown)  (no date)  (unknown)  (unknown)  Differential 

Diagnosis                               (units 

unknown)                                (unknown)

 

                      (unknown)  (no date)  (unknown)  (unknown)  Differential 

diagnosis: Likely 

other (corneal 

abrasion)                               (units 

unknown)                                (unknown)

 

                    (unknown) (no date) (unknown) (unknown) Discharge Plan (unit

s 

unknown)                                (unknown)

 

                      (unknown)  (no date)  (unknown)  (unknown)  Discontinued 

Medications                             (units 

unknown)                                (unknown)

 

                    (unknown) (no date) (unknown) (unknown) Documented By: [facility] (

units 

unknown)                                (unknown)

 

                      (unknown)  (no date)  (unknown)  (unknown)  ED Attending 

Cosignature 

Attestation:                            (units 

unknown)                                (unknown)

 

                      (unknown)  (no date)  (unknown)  (unknown)  ENT: Nose with

out 

bleeding, purulent 

drainage. Throat 

without erythema,                       (units 

unknown)                                (unknown)

 

                      (unknown)  (no date)  (unknown)  (unknown)  ER Physician: 

Angelia Alberts P.A-C                                   (units 

unknown)                                (unknown)

 

                      (unknown)  (no date)  (unknown)  (unknown)  EXTREMITIES: N

o 

edema or joint 

tenderness.                             (units 

unknown)                                (unknown)

 

                      (unknown)  (no date)  (unknown)  (unknown)  EYES: Pupils e

qual 

round and reactive. 

Extraocular motions 

intact. No scleral                      (units 

unknown)                                (unknown)

 

                    (unknown) (no date) (unknown) (unknown) Emergency Report (un

its 

unknown)                                (unknown)

 

                    (unknown) (no date) (unknown) (unknown) Exam Narrative: (uni

ts 

unknown)                                (unknown)

 

                    (unknown) (no date) (unknown) (unknown) Exam      (units 

unknown)                                (unknown)

 

                      (unknown)  (no date)  (unknown)  (unknown)  Fluorescein So

dium 

(Fluorescein 1 Mg 

Strip) 1 mg 

EYE-BOTH NOW ONE                        (units 

unknown)                                (unknown)

 

                      (unknown)  (no date)  (unknown)  (unknown)  Fluorescein ey

e 

exam                                    (units 

unknown)                                (unknown)

 

                      (unknown)  (no date)  (unknown)  (unknown)  GENERAL: 42 ye

ar 

old patient appears 

stated age. 

Well-developed 

patient, in mild                        (units 

unknown)                                (unknown)

 

                    (unknown) (no date) (unknown) (unknown) General   (units 

unknown)                                (unknown)

 

                      (unknown)  (no date)  (unknown)  (unknown)  HEAD: Atraumat

ic. 

Normocephalic.                          (units 

unknown)                                (unknown)

 

                    (unknown) (no date) (unknown) (unknown) HPI - Eye Problem (u

nits 

unknown)                                (unknown)

 

                    (unknown) (no date) (unknown) (unknown) HPI Narrative: (unit

s 

unknown)                                (unknown)

 

                      (unknown)  (no date)  (unknown)  (unknown)  History of Pre

sent 

Illness                                 (units 

unknown)                                (unknown)

 

                      (unknown)  (no date)  (unknown)  (unknown)  I was immediat

ely 

available in the 

department for 

consultation. 

Documentation                           (units 

unknown)                                (unknown)

 

                    (unknown) (no date) (unknown) (unknown) Initial Vital Signs 

(units 

unknown)                                (unknown)

 

                      (unknown)  (no date)  (unknown)  (unknown)  Initial Vital 

Signs:                                  (units 

unknown)                                (unknown)

 

                      (unknown)  (no date)  (unknown)  (unknown)  [address] [address]                     (units 

unknown)                                (unknown)

 

                      (unknown)  (no date)  (unknown)  (unknown)  Nikolai Simms MD

 

[Primary Care 

Provider]                               (units 

unknown)                                (unknown)

 

                      (unknown)  (no date)  (unknown)  (unknown)  Last Admin: 

23 18:39 

Dose: 1 drop                            (units 

unknown)                                (unknown)

 

                      (unknown)  (no date)  (unknown)  (unknown)  Last Admin: 

23 18:40 

Dose: 1 mg                              (units 

unknown)                                (unknown)

 

                    (unknown) (no date) (unknown) (unknown) Left eye  (units 

unknown)                                (unknown)

 

                    (unknown) (no date) (unknown) (unknown) Location: (units 

unknown)                                (unknown)

 

                    (unknown) (no date) (unknown) (unknown) MDM - Eye Problem (u

nits 

unknown)                                (unknown)

 

                    (unknown) (no date) (unknown) (unknown) MDM Narrative (units

 

unknown)                                (unknown)

 

                      (unknown)  (no date)  (unknown)  (unknown)  Medical decisi

on 

making narrative:                       (units 

unknown)                                (unknown)

 

                      (unknown)  (no date)  (unknown)  (unknown)  Medication 

Instructions 

Recorded                                (units 

unknown)                                (unknown)

 

                      (unknown)  (no date)  (unknown)  (unknown)  NECK: Trachea 

midline. Non tender                     (units 

unknown)                                (unknown)

 

                    (unknown) (no date) (unknown) (unknown) NEURO: AOx3. (units 

unknown)                                (unknown)

 

                    (unknown) (no date) (unknown) (unknown) Name of Procedure: (

units 

unknown)                                (unknown)

 

                    (unknown) (no date) (unknown) (unknown) Narrative (units 

unknown)                                (unknown)

 

                    (unknown) (no date) (unknown) (unknown) Narrative: (units 

unknown)                                (unknown)

 

                    (unknown) (no date) (unknown) (unknown) New       (units 

unknown)                                (unknown)

 

                    (unknown) (no date) (unknown) (unknown) Ordered:  (units 

unknown)                                (unknown)

 

                    (unknown) (no date) (unknown) (unknown) Orders    (units 

unknown)                                (unknown)

 

                      (unknown)  (no date)  (unknown)  (unknown)  Oxygen Deliver

y 

Method Room Air 

23 14:11                          (units 

unknown)                                (unknown)

 

                      (unknown)  (no date)  (unknown)  (unknown)  Oxygen Deliver

y 

Method Room Air                         (units 

unknown)                                (unknown)

 

                      (unknown)  (no date)  (unknown)  (unknown)  Patient 

Disposition: Home                       (units 

unknown)                                (unknown)

 

                      (unknown)  (no date)  (unknown)  (unknown)  Patient tolera

ricarda 

procedure: Well                         (units 

unknown)                                (unknown)

 

                      (unknown)  (no date)  (unknown)  (unknown)  Patient: 

Leigh Ann Ghotra 

MR#: [id number]                                 (units 

unknown)                                (unknown)

 

                      (unknown)  (no date)  (unknown)  (unknown)  [place] Al

nasgy 

Severe Difficulty 

Verified 23 

18:41                                   (units 

unknown)                                (unknown)

 

                    (unknown) (no date) (unknown) (unknown) Prescriptions: (unit

s 

unknown)                                (unknown)

 

                    (unknown) (no date) (unknown) (unknown) Previous Rx's (units

 

unknown)                                (unknown)

 

                    (unknown) (no date) (unknown) (unknown) Procedures (units 

unknown)                                (unknown)

 

                      (unknown)  (no date)  (unknown)  (unknown)  Proparacaine H

Cl 

(Proparacaine 0.5% 

Ophth Sol) 1 drops 

EYE-BOTH NOW ONE                        (units 

unknown)                                (unknown)

 

                      (unknown)  (no date)  (unknown)  (unknown)  Pulse Oximetry

 98 

23 14:11                          (units 

unknown)                                (unknown)

 

                    (unknown) (no date) (unknown) (unknown) Pulse Oximetry 98 (u

nits 

unknown)                                (unknown)

 

                      (unknown)  (no date)  (unknown)  (unknown)  Pulse Rate 81 

23 14:11                          (units 

unknown)                                (unknown)

 

                    (unknown) (no date) (unknown) (unknown) Pulse Rate 81 (units

 

unknown)                                (unknown)

 

                      (unknown)  (no date)  (unknown)  (unknown)  RESPIRATORY: C

lear 

to auscultation. 

Breath sounds equal 

bilaterally. No 

wheezes,                                (units 

unknown)                                (unknown)

 

                    (unknown) (no date) (unknown) (unknown) Referrals: (units 

unknown)                                (unknown)

 

                    (unknown) (no date) (unknown) (unknown) Related Data (units 

unknown)                                (unknown)

 

                      (unknown)  (no date)  (unknown)  (unknown)  Resource line 

at 

[phone]. They 

will ask some 

questions about 

your medical                            (units 

unknown)                                (unknown)

 

                      (unknown)  (no date)  (unknown)  (unknown)  Respiratory Ra

te 16 

23 14:11                          (units 

unknown)                                (unknown)

 

                    (unknown) (no date) (unknown) (unknown) Respiratory Rate 16 

(units 

unknown)                                (unknown)

 

                    (unknown) (no date) (unknown) (unknown) Review of Systems (u

nits 

unknown)                                (unknown)

 

                      (unknown)  (no date)  (unknown)  (unknown)  SKIN: No rash 

or 

erythema of visible 

areas                                   (units 

unknown)                                (unknown)

 

                    (unknown) (no date) (unknown) (unknown) See HPI   (units 

unknown)                                (unknown)

 

                      (unknown)  (no date)  (unknown)  (unknown)  Shared decisio

n 

making:: Shared 

decision-making was 

used and determine 

the                                     (units 

unknown)                                (unknown)

 

                    (unknown) (no date) (unknown) (unknown) Signed By: (units 

unknown)                                (unknown)

 

                      (unknown)  (no date)  (unknown)  (unknown)  Stand Alone Fo

lior: 

Patient Portal/API                      (units 

unknown)                                (unknown)

 

                      (unknown)  (no date)  (unknown)  (unknown)  Stated complai

nt: 

lt eye pain/injury                      (units 

unknown)                                (unknown)

 

                      (unknown)  (no date)  (unknown)  (unknown)  Stop: 23

 

17:36                                   (units 

unknown)                                (unknown)

 

                      (unknown)  (no date)  (unknown)  (unknown)  Technique/Desc

ripti

on of procedure 

performed:                              (units 

unknown)                                (unknown)

 

                      (unknown)  (no date)  (unknown)  (unknown)  Temperature 98

.4 F 

23 14:11                          (units 

unknown)                                (unknown)

 

                    (unknown) (no date) (unknown) (unknown) Temperature 98.4 F (

units 

unknown)                                (unknown)

 

                      (unknown)  (no date)  (unknown)  (unknown)  This is a 

well-appearing but 

anxious 42-year-old 

woman who presents 

with concern                            (units 

unknown)                                (unknown)

 

                      (unknown)  (no date)  (unknown)  (unknown)  Time Seen by 

Provider: 23 

16:40                                   (units 

unknown)                                (unknown)

 

                      (unknown)  (no date)  (unknown)  (unknown)  Treatment and 

disposition                             (units 

unknown)                                (unknown)

 

                    (unknown) (no date) (unknown) (unknown) Vital Signs - 8 hr (

units 

unknown)                                (unknown)

 

                    (unknown) (no date) (unknown) (unknown) Vital Signs (units 

unknown)                                (unknown)

 

                    (unknown) (no date) (unknown) (unknown) Vital signs: (units 

unknown)                                (unknown)

 

                      (unknown)  (no date)  (unknown)  (unknown)  [ ] New medica

tion 

written as a paper 

prescription                            (units 

unknown)                                (unknown)

 

                      (unknown)  (no date)  (unknown)  (unknown)  [ ] No new 

medications given                       (units 

unknown)                                (unknown)

 

                      (unknown)  (no date)  (unknown)  (unknown)  [1 ] New medic

ation 

prescriptions sent 

to your pharmacy: 

[Antibiotic                             (units 

unknown)                                (unknown)

 

                      (unknown)  (no date)  (unknown)  (unknown)  abrasion noted

 at 

the 9 o'clock 

position. Patient 

is placed on 

antibiotic                              (units 

unknown)                                (unknown)

 

                      (unknown)  (no date)  (unknown)  (unknown)  and ED precaut

ions 

provided, follow-up 

plan discussed, all 

questions answered.                     (units 

unknown)                                (unknown)

 

                      (unknown)  (no date)  (unknown)  (unknown)  appointment. L

et 

them know you were 

seen in the 

Emergency 

Department and that 

we                                      (units 

unknown)                                (unknown)

 

                      (unknown)  (no date)  (unknown)  (unknown)  area. She is 

worried she has a 

corneal abrasion. 

She is unsure she 

is had any                              (units 

unknown)                                (unknown)

 

                      (unknown)  (no date)  (unknown)  (unknown)  ask that you b

e 

seen in follow up. 

We will 

electronically 

transmit a record 

of                                      (units 

unknown)                                (unknown)

 

                      (unknown)  (no date)  (unknown)  (unknown)  been having pa

in 

under her eyelid 

with blinking and 

with her eye closed 

when her                                (units 

unknown)                                (unknown)

 

                      (unknown)  (no date)  (unknown)  (unknown)  concerning 

symptoms, such as 

[fever greater than 

101 F, shaking 

chills,                                 (units 

unknown)                                (unknown)

 

                    (unknown) (no date) (unknown) (unknown) corneal abrasion (un

its 

unknown)                                (unknown)

 

                      (unknown)  (no date)  (unknown)  (unknown)  discharge from

 her 

eye she thinks she 

is had a little bit 

of blurriness in 

her                                     (units 

unknown)                                (unknown)

 

                      (unknown)  (no date)  (unknown)  (unknown)  distress, obes

e, 

anxious appearing.                      (units 

unknown)                                (unknown)

 

                      (unknown)  (no date)  (unknown)  (unknown)  drops, advised

 to 

follow up with 

Optometry or 

Ophthalmology, 

return precautions                      (units 

unknown)                                (unknown)

 

                      (unknown)  (no date)  (unknown)  (unknown)  eye moves arou

nd. 

On exam patient 

does have very 

slight swelling and                     (units 

unknown)                                (unknown)

 

                      (unknown)  (no date)  (unknown)  (unknown)  eyedrops polym

yxin 

B sulf 

trimethoprim]                           (units 

unknown)                                (unknown)

 

                      (unknown)  (no date)  (unknown)  (unknown)  fluorescein ex

am 

there is a small 

corneal abrasion at 

approximately the 9                     (units 

unknown)                                (unknown)

 

                    (unknown) (no date) (unknown) (unknown) follow-up. (units 

unknown)                                (unknown)

 

                      (unknown)  (no date)  (unknown)  (unknown)  for possible 

corneal abrasion to 

her left eye 

sustained 3 days 

ago. Patient has                        (units 

unknown)                                (unknown)

 

                    (unknown) (no date) (unknown) (unknown) has been reviewed. (

units 

unknown)                                (unknown)

 

                      (unknown)  (no date)  (unknown)  (unknown)  history and he

lp 

get you set up with 

a doctor in the 

community.                              (units 

unknown)                                (unknown)

 

                      (unknown)  (no date)  (unknown)  (unknown)  icterus. There

 is 

very slight 

injection of the 

left eye, without 

drainage. On                            (units 

unknown)                                (unknown)

 

                      (unknown)  (no date)  (unknown)  (unknown)  inflammation t

o the 

upper eyelid, on 

fluorescein exam 

she has a small 

corneal                                 (units 

unknown)                                (unknown)

 

                      (unknown)  (no date)  (unknown)  (unknown)  instill dye in

 the 

left eye, black 

light exam under 

magnification 

reveals small                           (units 

unknown)                                (unknown)

 

                      (unknown)  (no date)  (unknown)  (unknown)  like there is 

slight bruising of 

her upper eyelid 

and it feels tender 

in the                                  (units 

unknown)                                (unknown)

 

                      (unknown)  (no date)  (unknown)  (unknown)  o'clock positi

on on 

the left cornea.                        (units 

unknown)                                (unknown)

 

                    (unknown) (no date) (unknown) (unknown) pain with movement. 

(units 

unknown)                                (unknown)

 

                      (unknown)  (no date)  (unknown)  (unknown)  patient's plan

 of 

care in the 

emergency 

department and plan 

for outpatient                          (units 

unknown)                                (unknown)

 

                      (unknown)  (no date)  (unknown)  (unknown)  persistent 

symptoms.                               (units 

unknown)                                (unknown)

 

                      (unknown)  (no date)  (unknown)  (unknown)  polymyxin B 

sulf-trimethoprim 

10,000 unit- 1 

mg/mL drops                             (units 

unknown)                                (unknown)

 

                      (unknown)  (no date)  (unknown)  (unknown)  polymyxin B redd

lfate 

10,000 1 drp 

EYE-LEFT QID 

corneal 23                        (units 

unknown)                                (unknown)

 

                    (unknown) (no date) (unknown) (unknown) rales, or rhonchi. (

units 

unknown)                                (unknown)

 

                      (unknown)  (no date)  (unknown)  (unknown)  symptoms, I 

recommend follow up 

with Optometry or 

Ophthalmology if 

you have                                (units 

unknown)                                (unknown)

 

                      (unknown)  (no date)  (unknown)  (unknown)  the eye by carrie hawley 3 

days ago. Patient 

states that she is 

been having pain 

with                                    (units 

unknown)                                (unknown)

 

                      (unknown)  (no date)  (unknown)  (unknown)  today's note i

f 

your PCP is in our 

system. Please 

monitor for new or 

worsening                               (units 

unknown)                                (unknown)

 

                      (unknown)  (no date)  (unknown)  (unknown)  tonsillar 

hypertrophy or 

exudate. Airway 

patent.                                 (units 

unknown)                                (unknown)

 

                      (unknown)  (no date)  (unknown)  (unknown)  unit-trimethop

rim 1 

mg/mL eye drops 

abrasion 7 days #10 

mL                                      (units 

unknown)                                (unknown)

 

                      (unknown)  (no date)  (unknown)  (unknown)  vision. She de

nies 

any other symptoms 

or complaints 

including 

headaches, eye                          (units 

unknown)                                (unknown)

 

                      (unknown)  (no date)  (unknown)  (unknown)  when she blink

s and 

when she has her 

eyes closed and 

moves around, she 

feels                                   (units 

unknown)                                (unknown)

 

                      (unknown)  (no date)  (unknown)  (unknown)  worsening pain

, 

persistent vomiting 

or other bothersome 

symptoms]                               (units 

unknown)                                (unknown)







Social History





                                date            description     facility

 

                                2023 00:00 Unknown if [facility]







Vital Signs





                          date         measurement  value        units

 

                          2023 00:00 BMI          40.3         kg/m2

 

                          2023 00:00 BP_diastolic 107          mmHg

 

                          2023 00:00 BP_systolic  142          mmHg

 

                          2023 00:00 heart_rate   81           /min

 

                          2023 00:00 height_metric 172.72       cm

 

                          2023 00:00 height_standard 68           in

 

                          2023 00:00 o2_saturation 98           %

 

                          2023 00:00 respiration_rate 16           /min

 

                          2023 00:00 temperature_metric 36.89        C

 

                          2023 00:00 temperature_standard 98.4         F

 

                          2023 00:00 weight_metric 120.2        kg

 

                          2023 00:00 weight_standard 265          lb

## 2023-06-29 NOTE — ED PHYSICIAN DOCUMENTATION
PD HPI ABD PAIN





- Stated complaint


Stated Complaint: ABD PX





- Chief complaint


Chief Complaint: Abd Pain





- History obtained from


History obtained from: Patient





- History of Present Illness


Timing - onset: Today


Timing - details: Abrupt onset (typically with morning nausea and sometimes 

vomiting (relatively often actually). Today was more notable nasuea and has hd 

multiple emeses.), Still present


Quality: Cramping, Aching


Location: Other (lower abd)


Improved by: No: Vomiting


Worsened by: Eating


Associated symptoms: Nausea, Vomiting, Diarrhea (chronic).  No: Fever


Similar symptoms before: No diagnosis (She states she has nausea most every day 

and uses ondansetron commonly.  She describes diarrhea daily as well.  She 

states she has had gastroenterology work-up with scans, blood test, 2 or 3 

colonoscopies and stool studies without a firm diagnosis.  Has worse than usual 

nausea today.)


Recently seen: Not recently seen





Review of Systems


Constitutional: denies: Fever, Chills


Nose: denies: Rhinorrhea / runny nose, Congestion


Throat: denies: Sore throat


Respiratory: denies: Cough


: denies: Dysuria, Frequency


Neurologic: denies: Generalized weakness





PD PAST MEDICAL HISTORY





- Past Medical History


Cardiovascular: Arrhythmia


Respiratory: Asthma


Neuro: Head injury, Migraines


Endocrine/Autoimmune: None


GI: Chronic diarrhea, Other


GYN: Ovarian cysts


: Other


HEENT: None


Psych: None


Musculoskeletal: Osteoarthritis, Fibromyalgia


Derm: Psoriasis





- Past Surgical History


Past Surgical History: Yes


General: Appendectomy


Ortho: Arthroscopic surgery


HEENT: Other





- Present Medications


Home Medications: 


                                Ambulatory Orders











 Medication  Instructions  Recorded  Confirmed


 


Albuterol 2.5 mg INH Q4H PRN 09/08/13 01/13/23


 


Cetirizine [ZyrTEC] 10 mg PO DAILY 09/08/13 01/13/23


 


Dicyclomine [Bentyl] 20 mg PO QID 09/08/13 01/13/23


 


Diphenoxylate HCl/Atropine 1 each PO Q4HR 09/08/13 01/13/23





[Lomotil Tablet]   


 


Multivitamin,Ther and Minerals 1 each PO DAILY 09/08/13 01/13/23





[Vitamin and Minerals]   


 


Ondansetron Oral Soln [Zofran] 4 mg PO Q6HR PRN 09/08/13 01/13/23


 


atenoloL [Tenormin] 50 mg PO DAILY 09/08/13 01/13/23


 


Oxycodone HCl/Acetaminophen 1 - 2 each PO Q6H PRN #10 tablet 06/06/14 01/13/23





[Percocet 5-325 mg Tablet]   


 


Gabapentin [Neurontin] 600 mg PO TID 01/13/23 01/13/23


 


LORazepam [Ativan] 0.5 mg PO PRN 01/13/23 


 


methocarbamoL [Methocarbamol] 750 mg PO TID 01/13/23 01/13/23


 


Ondansetron HCl 4 mg PO Q8H PRN #30 tablet 06/29/23 


 


Promethazine Supp [Phenergan Supp] 25 mg AL Q6H PRN #10 supp 06/29/23 














- Allergies


Allergies/Adverse Reactions: 


                                    Allergies











Allergy/AdvReac Type Severity Reaction Status Date / Time


 


Penicillins Allergy Intermediate Hives Verified 06/29/23 10:37


 


codeine [Codeine] Allergy  Hives Verified 06/29/23 10:37














- Social History


Does the pt smoke?: No


Smoking Status: Never smoker


Does the pt drink ETOH?: No


Does the pt have substance abuse?: No





- Immunizations


Immunizations are current?: Yes





- POLST


Patient has POLST: No





PD ED PE NORMAL





- Vitals


Vital signs reviewed: Yes





- General


General: Alert and oriented X 3, No acute distress, Well developed/nourished





- HEENT


HEENT: Pharynx benign





- Neck


Neck: Supple, no meningeal sign, No adenopathy





- Cardiac


Cardiac: RRR, No murmur





- Respiratory


Respiratory: Clear bilaterally





- Abdomen


Abdomen: Normal bowel sounds, Soft, Non distended, Other (Mild tenderness in the

 mid abdomen to lower abdomen without any percussion or rebound tenderness.  She

 states this area of tenderness is chronic for her.)





- Female 


Female : Deferred





- Rectal


Rectal: Deferred





- Derm


Derm: Normal color, Warm and dry





Results





- Vitals


Vitals: 


                               Vital Signs - 24 hr











  06/29/23 06/29/23 06/29/23





  10:35 12:47 14:21


 


Temperature 36.8 C  


 


Heart Rate 66 64 74


 


Respiratory 16 16 17





Rate   


 


Blood Pressure 145/81 H 146/78 H 155/74 H


 


O2 Saturation 100 100 99








                                     Oxygen











O2 Source                      Room air

















- Labs


Labs: 


                                Laboratory Tests











  06/29/23 06/29/23





  11:36 11:36


 


WBC  9.7 


 


RBC  4.70 


 


Hgb  14.1 


 


Hct  41.4 


 


MCV  88.1 


 


MCH  30.0 


 


MCHC  34.1 


 


RDW  11.9 L 


 


Plt Count  218 


 


MPV  9.2 


 


Neut # (Auto)  8.3 H 


 


Lymph # (Auto)  1.1 L 


 


Mono # (Auto)  0.3 


 


Eos # (Auto)  0.0 


 


Baso # (Auto)  0.0 


 


Absolute Nucleated RBC  0.00 


 


Nucleated RBC %  0.0 


 


Sodium   139


 


Potassium   3.6


 


Chloride   106


 


Carbon Dioxide   26


 


Anion Gap   7.0


 


BUN   17


 


Creatinine   0.8


 


Estimated GFR (MDRD)   78 L


 


Glucose   106 H


 


Calcium   9.2


 


Magnesium   1.8


 


Total Bilirubin   0.6


 


AST   17


 


ALT   18


 


Alkaline Phosphatase   57


 


Total Protein   7.8


 


Albumin   4.5


 


Globulin   3.3


 


Albumin/Globulin Ratio   1.4


 


Lipase   57 H














PD Medical Decision Making





- ED course


Complexity details: re-evaluated patient (she had decreased nausea to at least 

not be vomiting. But complains of still nauseated. Given several meds for this 

without resolution. Thugh she does have history of baseline chronic nausea, so 

the end-goal of no nausea may be difficult. She is subsequently improved enough 

to feel want discharge.), considered differential (she has chronic nausea and 

diarrhea, abd cramping. Symptoms worse today than usual. Abd exam nonperitoneal.

 Shared decision with patient to treat symptoms and not do much testing. ), d/w 

patient


ED course: 





The patient describes chronic abdominal pain and diarrhea with nausea for years 

with prior evaluation by gastroenterology.  Moved here relatively recently from 

[facility] [place] I believe she said.  Does not have a gastroenterologist here.  She 

is continued with ondansetron.  She has had trials of different medications in 

the past without any notable success, such as dicyclomine and Levsin and nausea 

medicines.





Her symptoms today were more pronounced nausea with some vomiting.  Otherwise 

similar symptoms to her chronic symptoms.  No recent cold or flu symptoms.





Given her extensive history and prior work-ups relatively recently, shared 

discussion with the patient was to mainly focus on treating symptoms without a 

notable work-up involved.





We will give her IV fluids and antiemetics with some Toradol as well and see how

 much improved she is.  I will check basic chemistry labs.





Departure





- Departure


Disposition: 01 Home, Self Care


Clinical Impression: 


 Nausea and vomiting, Chronic diarrhea, Chronic abdominal pain





Condition: Stable


Record reviewed to determine appropriate education?: Yes


Instructions:  ED Nausea Vomiting


Prescriptions: 


Ondansetron HCl 4 mg PO Q8H PRN #30 tablet


 PRN Reason: Nausea / Vomiting


Promethazine Supp [Phenergan Supp] 25 mg AL Q6H PRN #10 supp


 PRN Reason: Nausea / Vomiting


Comments: 


Continue with your usual medications.  Follow-up with your primary care.  I did 

write prescriptions for the ondansetron not ODT version as your request.  I also

wrote for promethazine suppositories to use if needed for vomiting where you are

unable to take medicines down.





I sent these to your preferred pharmacy.





Home and rest today.  Continue with usual medications subsequently.  Diet as 

tolerated.


Discharge Date/Time: 06/29/23 15:08

## 2023-07-06 ENCOUNTER — HOSPITAL ENCOUNTER (EMERGENCY)
Dept: HOSPITAL 76 - ED | Age: 43
Discharge: HOME | End: 2023-07-06
Payer: MEDICARE

## 2023-07-06 ENCOUNTER — HOSPITAL ENCOUNTER (OUTPATIENT)
Dept: HOSPITAL 76 - EMS | Age: 43
Discharge: ANOTHER HOSPITAL | End: 2023-07-06
Payer: MEDICARE

## 2023-07-06 VITALS — SYSTOLIC BLOOD PRESSURE: 142 MMHG | DIASTOLIC BLOOD PRESSURE: 66 MMHG

## 2023-07-06 DIAGNOSIS — R11.0: Primary | ICD-10-CM

## 2023-07-06 DIAGNOSIS — R11.2: Primary | ICD-10-CM

## 2023-07-06 LAB
ALBUMIN DIAFP-MCNC: 4.3 G/DL (ref 3.2–5.5)
ALBUMIN/GLOB SERPL: 1.5 {RATIO} (ref 1–2.2)
ALP SERPL-CCNC: 57 IU/L (ref 42–121)
ALT SERPL W P-5'-P-CCNC: 19 IU/L (ref 10–60)
ANION GAP SERPL CALCULATED.4IONS-SCNC: 8 MMOL/L (ref 6–13)
AST SERPL W P-5'-P-CCNC: 17 IU/L (ref 10–42)
B-HCG SERPL QL: NEGATIVE
BASOPHILS NFR BLD AUTO: 0 10^3/UL (ref 0–0.1)
BASOPHILS NFR BLD AUTO: 0.3 %
BILIRUB BLD-MCNC: 0.4 MG/DL (ref 0.2–1)
BUN SERPL-MCNC: 14 MG/DL (ref 6–20)
CALCIUM UR-MCNC: 9.1 MG/DL (ref 8.5–10.3)
CHLORIDE SERPL-SCNC: 105 MMOL/L (ref 101–111)
CO2 SERPL-SCNC: 25 MMOL/L (ref 21–32)
CREAT SERPLBLD-SCNC: 0.8 MG/DL (ref 0.4–1)
EOSINOPHIL # BLD AUTO: 0.1 10^3/UL (ref 0–0.7)
EOSINOPHIL NFR BLD AUTO: 2 %
ERYTHROCYTE [DISTWIDTH] IN BLOOD BY AUTOMATED COUNT: 11.8 % (ref 12–15)
GFRSERPLBLD MDRD-ARVRAT: 78 ML/MIN/{1.73_M2} (ref 89–?)
GLOBULIN SER-MCNC: 2.8 G/DL (ref 2.1–4.2)
GLUCOSE SERPL-MCNC: 134 MG/DL (ref 70–100)
HCT VFR BLD AUTO: 40.5 % (ref 37–47)
HGB UR QL STRIP: 13.5 G/DL (ref 12–16)
LIPASE SERPL-CCNC: 25 U/L (ref 22–51)
LYMPHOCYTES # SPEC AUTO: 1.5 10^3/UL (ref 1.5–3.5)
LYMPHOCYTES NFR BLD AUTO: 20.5 %
MCH RBC QN AUTO: 29.4 PG (ref 27–31)
MCHC RBC AUTO-ENTMCNC: 33.3 G/DL (ref 32–36)
MCV RBC AUTO: 88.2 FL (ref 81–99)
MONOCYTES # BLD AUTO: 0.3 10^3/UL (ref 0–1)
MONOCYTES NFR BLD AUTO: 3.8 %
NEUTROPHILS # BLD AUTO: 5.2 10^3/UL (ref 1.5–6.6)
NEUTROPHILS # SNV AUTO: 7.1 X10^3/UL (ref 4.8–10.8)
NEUTROPHILS NFR BLD AUTO: 73.3 %
NRBC # BLD AUTO: 0 /100WBC
NRBC # BLD AUTO: 0 X10^3/UL
PDW BLD AUTO: 9.4 FL (ref 7.9–10.8)
PLATELET # BLD: 194 10^3/UL (ref 130–450)
POTASSIUM SERPL-SCNC: 4.1 MMOL/L (ref 3.5–5)
PROT SPEC-MCNC: 7.1 G/DL (ref 6.7–8.2)
RBC MAR: 4.59 10^6/UL (ref 4.2–5.4)
SODIUM SERPLBLD-SCNC: 138 MMOL/L (ref 135–145)

## 2023-07-06 PROCEDURE — 80053 COMPREHEN METABOLIC PANEL: CPT

## 2023-07-06 PROCEDURE — 96375 TX/PRO/DX INJ NEW DRUG ADDON: CPT

## 2023-07-06 PROCEDURE — 96374 THER/PROPH/DIAG INJ IV PUSH: CPT

## 2023-07-06 PROCEDURE — 99283 EMERGENCY DEPT VISIT LOW MDM: CPT

## 2023-07-06 PROCEDURE — 36415 COLL VENOUS BLD VENIPUNCTURE: CPT

## 2023-07-06 PROCEDURE — 84703 CHORIONIC GONADOTROPIN ASSAY: CPT

## 2023-07-06 PROCEDURE — 83690 ASSAY OF LIPASE: CPT

## 2023-07-06 PROCEDURE — 85025 COMPLETE CBC W/AUTO DIFF WBC: CPT

## 2023-07-06 PROCEDURE — 99284 EMERGENCY DEPT VISIT MOD MDM: CPT

## 2023-07-06 NOTE — EXTERNAL MEDICAL SUMMARY RPT
Continuity of Care Document



                            Created on: 2023





LEIGH ANN GHOTRA

External Reference #: [id number]

: 1980

Sex: Female



Demographics





                                        Address             [address] [place] [address] [place]  [address]

 

                                        Phone               Unavailable

 

                                        Preferred Language  English

 

                                        Marital Status      Never 

 

                                        Congregation Affiliation Unknown

 

                                        Race                Unknown

 

                                        Ethnic Group        Unknown





Author





                                        Name                Unknown

 

                                        [place]  [address]

 

                                        Phone               [phone]

 

                                        Organization        [facility]

 

                                        [place]  [address]

 

                                        Phone               [phone]





Care Team Providers





                                Care Team Member Name Role            Phone

 

                                Nikolai Simms       Unavailable     Unavailable







Allergies and Intolerances





                          date         description  facility     type

 

                          (no date)    [facility] (unknown)







Medications





                                date            description     facility

 

                                2023 00:00 Polymyxin B Sulf-Trimethoprim [facility]







Problems





                                date            description     facility

 

                                2023 00:00 Corneal abrasion [facility]







Results/Labs





                test    date    author  facility value   unit    interpretation

 

                                                    Result panel 1 

 

                    (unknown) (no date) (unknown) (unknown) (no value) (units 

unknown)                                (unknown)

 

                    (unknown) (no date) (unknown) (unknown) [id number]  (units 

unknown)                                (unknown)

 

                    (unknown) (no date) (unknown) (unknown) 23  (units 

unknown)                                (unknown)

 

                    (unknown) (no date) (unknown) (unknown) 14:11     (units 

unknown)                                (unknown)

 

                    (unknown) (no date) (unknown) (unknown) Age/Sex: 42 / F (uni

ts 

unknown)                                (unknown)

 

                      (unknown)  (no date)  (unknown)  (unknown)  Blood Pressure

 

142/107 H 23 

14:11                                   (units 

unknown)                                (unknown)

 

                      (unknown)  (no date)  (unknown)  (unknown)  Blood Pressure

 

142/107 H                               (units 

unknown)                                (unknown)

 

                      (unknown)  (no date)  (unknown)  (unknown)  Chief complain

t: 

Eye Problems                            (units 

unknown)                                (unknown)

 

                    (unknown) (no date) (unknown) (unknown) Course    (units 

unknown)                                (unknown)

 

                      (unknown)  (no date)  (unknown)  (unknown)  : 

8 

Acct:[id number]                         (units 

unknown)                                (unknown)

 

                      (unknown)  (no date)  (unknown)  (unknown)  Date of Servic

e: 

23                                (units 

unknown)                                (unknown)

 

                    (unknown) (no date) (unknown) (unknown) Departure (units 

unknown)                                (unknown)

 

                    (unknown) (no date) (unknown) (unknown) Discharge Plan (unit

s 

unknown)                                (unknown)

 

                      (unknown)  (no date)  (unknown)  (unknown)  Discontinued 

Medications                             (units 

unknown)                                (unknown)

 

                      (unknown)  (no date)  (unknown)  (unknown)  ER Physician: 

Angelia Alberts P.A-C                                   (units 

unknown)                                (unknown)

 

                    (unknown) (no date) (unknown) (unknown) Emergency Report (un

its 

unknown)                                (unknown)

 

                    (unknown) (no date) (unknown) (unknown) Exam      (units 

unknown)                                (unknown)

 

                      (unknown)  (no date)  (unknown)  (unknown)  Fluorescein So

dium 

(Fluorescein 1 Mg 

Strip) 1 mg 

EYE-BOTH NOW ONE                        (units 

unknown)                                (unknown)

 

                    (unknown) (no date) (unknown) (unknown) General   (units 

unknown)                                (unknown)

 

                    (unknown) (no date) (unknown) (unknown) HPI - Eye Problem (u

nits 

unknown)                                (unknown)

 

                    (unknown) (no date) (unknown) (unknown) Initial Vital Signs 

(units 

unknown)                                (unknown)

 

                      (unknown)  (no date)  (unknown)  (unknown)  Initial Vital 

Signs:                                  (units 

unknown)                                (unknown)

 

                      (unknown)  (no date)  (unknown)  (unknown)  [address] [address]                     (units 

unknown)                                (unknown)

 

                      (unknown)  (no date)  (unknown)  (unknown)  Nikolai Simms MD

 

[Primary Care 

Provider]                               (units 

unknown)                                (unknown)

 

                    (unknown) (no date) (unknown) (unknown) Ordered:  (units 

unknown)                                (unknown)

 

                    (unknown) (no date) (unknown) (unknown) Orders    (units 

unknown)                                (unknown)

 

                      (unknown)  (no date)  (unknown)  (unknown)  Oxygen Deliver

y 

Method Room Air 

23 14:11                          (units 

unknown)                                (unknown)

 

                      (unknown)  (no date)  (unknown)  (unknown)  Oxygen Deliver

y 

Method Room Air                         (units 

unknown)                                (unknown)

 

                      (unknown)  (no date)  (unknown)  (unknown)  Patient: 

Leigh Ann hGotra 

MR#: [id number]                                 (units 

unknown)                                (unknown)

 

                      (unknown)  (no date)  (unknown)  (unknown)  Proparacaine H

Cl 

(Proparacaine 0.5% 

Ophth Sol) 1 drops 

EYE-BOTH NOW ONE                        (units 

unknown)                                (unknown)

 

                      (unknown)  (no date)  (unknown)  (unknown)  Pulse Oximetry

 98 

23 14:11                          (units 

unknown)                                (unknown)

 

                    (unknown) (no date) (unknown) (unknown) Pulse Oximetry 98 (u

nits 

unknown)                                (unknown)

 

                      (unknown)  (no date)  (unknown)  (unknown)  Pulse Rate 81 

23 14:11                          (units 

unknown)                                (unknown)

 

                    (unknown) (no date) (unknown) (unknown) Pulse Rate 81 (units

 

unknown)                                (unknown)

 

                    (unknown) (no date) (unknown) (unknown) Referrals: (units 

unknown)                                (unknown)

 

                      (unknown)  (no date)  (unknown)  (unknown)  Respiratory Ra

te 16 

23 14:11                          (units 

unknown)                                (unknown)

 

                    (unknown) (no date) (unknown) (unknown) Respiratory Rate 16 

(units 

unknown)                                (unknown)

 

                    (unknown) (no date) (unknown) (unknown) Signed By: (units 

unknown)                                (unknown)

 

                      (unknown)  (no date)  (unknown)  (unknown)  Stated complai

nt: 

lt eye pain/injury                      (units 

unknown)                                (unknown)

 

                      (unknown)  (no date)  (unknown)  (unknown)  Stop: 23

 

17:36                                   (units 

unknown)                                (unknown)

 

                      (unknown)  (no date)  (unknown)  (unknown)  Temperature 98

.4 F 

23 14:11                          (units 

unknown)                                (unknown)

 

                    (unknown) (no date) (unknown) (unknown) Temperature 98.4 F (

units 

unknown)                                (unknown)

 

                      (unknown)  (no date)  (unknown)  (unknown)  Time Seen by 

Provider: 23 

16:40                                   (units 

unknown)                                (unknown)

 

                    (unknown) (no date) (unknown) (unknown) Vital Signs - 8 hr (

units 

unknown)                                (unknown)

 

                    (unknown) (no date) (unknown) (unknown) Vital Signs (units 

unknown)                                (unknown)

 

                    (unknown) (no date) (unknown) (unknown) Vital signs: (units 

unknown)                                (unknown)

 

                                                    Result panel 2 

 

                    (unknown) (no date) (unknown) (unknown) (no value) (units 

unknown)                                (unknown)

 

                      (unknown)  (no date)  (unknown)  (unknown)  <Electronicall

y 

signed by Angelia Alberts>                          (units 

unknown)                                (unknown)

 

                    (unknown) (no date) (unknown) (unknown) **Misc Procedure (un

its 

unknown)                                (unknown)

 

                      (unknown)  (no date)  (unknown)  (unknown)  *If you do not

 have 

a primary care 

provider please 

contact the [facility]                                (units 

unknown)                                (unknown)

 

                      (unknown)  (no date)  (unknown)  (unknown)  *Please contin

ue to 

take your regular 

medications as 

directed.                               (units 

unknown)                                (unknown)

 

                      (unknown)  (no date)  (unknown)  (unknown)  *Please follow

 up 

with your primary 

care provider in 

2-3 days, call for 

an                                      (units 

unknown)                                (unknown)

 

                      (unknown)  (no date)  (unknown)  (unknown)  *Return to 

Emergency 

Department if you 

should have any 

new, worsening or                       (units 

unknown)                                (unknown)

 

                    (unknown) (no date) (unknown) (unknown) *What to do: (units 

unknown)                                (unknown)

 

                      (unknown)  (no date)  (unknown)  (unknown)  *You have been

 

diagnosed with 

corneal abrasion                        (units 

unknown)                                (unknown)

 

                    (unknown) (no date) (unknown) (unknown) [id number]  (units 

unknown)                                (unknown)

 

                    (unknown) (no date) (unknown) (unknown) 23 1928 (units

 

unknown)                                (unknown)

 

                    (unknown) (no date) (unknown) (unknown) 23  (units 

unknown)                                (unknown)

 

                      (unknown)  (no date)  (unknown)  (unknown)  1 drp EYE-LEFT

 QID 

7 Days Qty: 10 0RF                      (units 

unknown)                                (unknown)

 

                    (unknown) (no date) (unknown) (unknown) 14:11     (units 

unknown)                                (unknown)

 

                      (unknown)  (no date)  (unknown)  (unknown)  42-year-old wo

man 

presents with 

concern for left 

eye pain after she 

was poked in                            (units 

unknown)                                (unknown)

 

                      (unknown)  (no date)  (unknown)  (unknown)  Activity 

Restrictions/Additi

onal Instructions:                      (units 

unknown)                                (unknown)

 

                      (unknown)  (no date)  (unknown)  (unknown)  After instilla

tion 

of 2 drops of 

proparacaine, 

fluorescein strip 

was used to                             (units 

unknown)                                (unknown)

 

                    (unknown) (no date) (unknown) (unknown) Age/Sex: 42 / F (uni

ts 

unknown)                                (unknown)

 

                    (unknown) (no date) (unknown) (unknown) Allergies (units 

unknown)                                (unknown)

 

                      (unknown)  (no date)  (unknown)  (unknown)  Allergy/AdvRea

c 

Type Severity 

Reaction Status 

Date / Time                             (units 

unknown)                                (unknown)

 

                      (unknown)  (no date)  (unknown)  (unknown)  Blood Pressure

 

142/107 H 23 

14:11                                   (units 

unknown)                                (unknown)

 

                      (unknown)  (no date)  (unknown)  (unknown)  Blood Pressure

 

142/107 H                               (units 

unknown)                                (unknown)

 

                    (unknown) (no date) (unknown) (unknown) Breathing (units 

unknown)                                (unknown)

 

                      (unknown)  (no date)  (unknown)  (unknown)  CARDIOVASCULAR

: 

Regular rate and 

rhythm without 

murmurs, gallops, 

or rubs.                                (units 

unknown)                                (unknown)

 

                      (unknown)  (no date)  (unknown)  (unknown)  Chief complain

t: 

Eye Problems                            (units 

unknown)                                (unknown)

 

                      (unknown)  (no date)  (unknown)  (unknown)  Clinical 

Impression:                             (units 

unknown)                                (unknown)

 

                    (unknown) (no date) (unknown) (unknown) Complications: none 

(units 

unknown)                                (unknown)

 

                    (unknown) (no date) (unknown) (unknown) Corneal abrasion (un

its 

unknown)                                (unknown)

 

                    (unknown) (no date) (unknown) (unknown) Course    (units 

unknown)                                (unknown)

 

                      (unknown)  (no date)  (unknown)  (unknown)  : 

8 

Acct:[id number]                         (units 

unknown)                                (unknown)

 

                      (unknown)  (no date)  (unknown)  (unknown)  Date of Servic

e: 

23                                (units 

unknown)                                (unknown)

 

                    (unknown) (no date) (unknown) (unknown) Departure (units 

unknown)                                (unknown)

 

                      (unknown)  (no date)  (unknown)  (unknown)  Differential 

Diagnosis                               (units 

unknown)                                (unknown)

 

                      (unknown)  (no date)  (unknown)  (unknown)  Differential 

diagnosis: Likely 

other (corneal 

abrasion)                               (units 

unknown)                                (unknown)

 

                    (unknown) (no date) (unknown) (unknown) Discharge Plan (unit

s 

unknown)                                (unknown)

 

                      (unknown)  (no date)  (unknown)  (unknown)  Discontinued 

Medications                             (units 

unknown)                                (unknown)

 

                    (unknown) (no date) (unknown) (unknown) Documented By: [facility] (

units 

unknown)                                (unknown)

 

                      (unknown)  (no date)  (unknown)  (unknown)  ENT: Nose with

out 

bleeding, purulent 

drainage. Throat 

without erythema,                       (units 

unknown)                                (unknown)

 

                      (unknown)  (no date)  (unknown)  (unknown)  ER Physician: 

Angelia Alberts P.A-C                                   (units 

unknown)                                (unknown)

 

                      (unknown)  (no date)  (unknown)  (unknown)  EXTREMITIES: N

o 

edema or joint 

tenderness.                             (units 

unknown)                                (unknown)

 

                      (unknown)  (no date)  (unknown)  (unknown)  EYES: Pupils e

qual 

round and reactive. 

Extraocular motions 

intact. No scleral                      (units 

unknown)                                (unknown)

 

                    (unknown) (no date) (unknown) (unknown) Emergency Report (un

its 

unknown)                                (unknown)

 

                    (unknown) (no date) (unknown) (unknown) Exam Narrative: (uni

ts 

unknown)                                (unknown)

 

                    (unknown) (no date) (unknown) (unknown) Exam      (units 

unknown)                                (unknown)

 

                      (unknown)  (no date)  (unknown)  (unknown)  Fluorescein So

dium 

(Fluorescein 1 Mg 

Strip) 1 mg 

EYE-BOTH NOW ONE                        (units 

unknown)                                (unknown)

 

                      (unknown)  (no date)  (unknown)  (unknown)  Fluorescein ey

e 

exam                                    (units 

unknown)                                (unknown)

 

                      (unknown)  (no date)  (unknown)  (unknown)  GENERAL: 42 ye

ar 

old patient appears 

stated age. 

Well-developed 

patient, in mild                        (units 

unknown)                                (unknown)

 

                    (unknown) (no date) (unknown) (unknown) General   (units 

unknown)                                (unknown)

 

                      (unknown)  (no date)  (unknown)  (unknown)  HEAD: Atraumat

ic. 

Normocephalic.                          (units 

unknown)                                (unknown)

 

                    (unknown) (no date) (unknown) (unknown) HPI - Eye Problem (u

nits 

unknown)                                (unknown)

 

                    (unknown) (no date) (unknown) (unknown) HPI Narrative: (unit

s 

unknown)                                (unknown)

 

                      (unknown)  (no date)  (unknown)  (unknown)  History of Pre

sent 

Illness                                 (units 

unknown)                                (unknown)

 

                    (unknown) (no date) (unknown) (unknown) Initial Vital Signs 

(units 

unknown)                                (unknown)

 

                      (unknown)  (no date)  (unknown)  (unknown)  Initial Vital 

Signs:                                  (units 

unknown)                                (unknown)

 

                      (unknown)  (no date)  (unknown)  (unknown)  [address] [address]                     (units 

unknown)                                (unknown)

 

                      (unknown)  (no date)  (unknown)  (unknown)  Nikolai Simms MD

 

[Primary Care 

Provider]                               (units 

unknown)                                (unknown)

 

                      (unknown)  (no date)  (unknown)  (unknown)  Last Admin: 

23 18:39 

Dose: 1 drop                            (units 

unknown)                                (unknown)

 

                      (unknown)  (no date)  (unknown)  (unknown)  Last Admin: 

23 18:40 

Dose: 1 mg                              (units 

unknown)                                (unknown)

 

                    (unknown) (no date) (unknown) (unknown) Left eye  (units 

unknown)                                (unknown)

 

                    (unknown) (no date) (unknown) (unknown) Location: (units 

unknown)                                (unknown)

 

                    (unknown) (no date) (unknown) (unknown) MDM - Eye Problem (u

nits 

unknown)                                (unknown)

 

                    (unknown) (no date) (unknown) (unknown) MDM Narrative (units

 

unknown)                                (unknown)

 

                      (unknown)  (no date)  (unknown)  (unknown)  Medical decisi

on 

making narrative:                       (units 

unknown)                                (unknown)

 

                      (unknown)  (no date)  (unknown)  (unknown)  Medication 

Instructions 

Recorded                                (units 

unknown)                                (unknown)

 

                      (unknown)  (no date)  (unknown)  (unknown)  NECK: Trachea 

midline. Non tender                     (units 

unknown)                                (unknown)

 

                    (unknown) (no date) (unknown) (unknown) NEURO: AOx3. (units 

unknown)                                (unknown)

 

                    (unknown) (no date) (unknown) (unknown) Name of Procedure: (

units 

unknown)                                (unknown)

 

                    (unknown) (no date) (unknown) (unknown) Narrative (units 

unknown)                                (unknown)

 

                    (unknown) (no date) (unknown) (unknown) Narrative: (units 

unknown)                                (unknown)

 

                    (unknown) (no date) (unknown) (unknown) New       (units 

unknown)                                (unknown)

 

                    (unknown) (no date) (unknown) (unknown) Ordered:  (units 

unknown)                                (unknown)

 

                    (unknown) (no date) (unknown) (unknown) Orders    (units 

unknown)                                (unknown)

 

                      (unknown)  (no date)  (unknown)  (unknown)  Oxygen Deliver

y 

Method Room Air 

23 14:11                          (units 

unknown)                                (unknown)

 

                      (unknown)  (no date)  (unknown)  (unknown)  Oxygen Deliver

y 

Method Room Air                         (units 

unknown)                                (unknown)

 

                      (unknown)  (no date)  (unknown)  (unknown)  Patient 

Disposition: Home                       (units 

unknown)                                (unknown)

 

                      (unknown)  (no date)  (unknown)  (unknown)  Patient tolera

ricarda 

procedure: Well                         (units 

unknown)                                (unknown)

 

                      (unknown)  (no date)  (unknown)  (unknown)  Patient: 

Leigh Ann Ghotra 

MR#: [id number]                                 (units 

unknown)                                (unknown)

 

                      (unknown)  (no date)  (unknown)  (unknown)  Penicillins Al

lergy 

Severe Difficulty 

Verified 23 

18:41                                   (units 

unknown)                                (unknown)

 

                    (unknown) (no date) (unknown) (unknown) Prescriptions: (unit

s 

unknown)                                (unknown)

 

                    (unknown) (no date) (unknown) (unknown) Previous Rx's (units

 

unknown)                                (unknown)

 

                    (unknown) (no date) (unknown) (unknown) Procedures (units 

unknown)                                (unknown)

 

                      (unknown)  (no date)  (unknown)  (unknown)  Proparacaine H

Cl 

(Proparacaine 0.5% 

Ophth Sol) 1 drops 

EYE-BOTH NOW ONE                        (units 

unknown)                                (unknown)

 

                      (unknown)  (no date)  (unknown)  (unknown)  Pulse Oximetry

 98 

23 14:11                          (units 

unknown)                                (unknown)

 

                    (unknown) (no date) (unknown) (unknown) Pulse Oximetry 98 (u

nits 

unknown)                                (unknown)

 

                      (unknown)  (no date)  (unknown)  (unknown)  Pulse Rate 81 

23 14:11                          (units 

unknown)                                (unknown)

 

                    (unknown) (no date) (unknown) (unknown) Pulse Rate 81 (units

 

unknown)                                (unknown)

 

                      (unknown)  (no date)  (unknown)  (unknown)  RESPIRATORY: C

lear 

to auscultation. 

Breath sounds equal 

bilaterally. No 

wheezes,                                (units 

unknown)                                (unknown)

 

                    (unknown) (no date) (unknown) (unknown) Referrals: (units 

unknown)                                (unknown)

 

                    (unknown) (no date) (unknown) (unknown) Related Data (units 

unknown)                                (unknown)

 

                      (unknown)  (no date)  (unknown)  (unknown)  Resource line 

at 

[phone]. They 

will ask some 

questions about 

your medical                            (units 

unknown)                                (unknown)

 

                      (unknown)  (no date)  (unknown)  (unknown)  Respiratory Ra

te 16 

23 14:11                          (units 

unknown)                                (unknown)

 

                    (unknown) (no date) (unknown) (unknown) Respiratory Rate 16 

(units 

unknown)                                (unknown)

 

                    (unknown) (no date) (unknown) (unknown) Review of Systems (u

nits 

unknown)                                (unknown)

 

                      (unknown)  (no date)  (unknown)  (unknown)  SKIN: No rash 

or 

erythema of visible 

areas                                   (units 

unknown)                                (unknown)

 

                    (unknown) (no date) (unknown) (unknown) See HPI   (units 

unknown)                                (unknown)

 

                      (unknown)  (no date)  (unknown)  (unknown)  Shared decisio

n 

making:: Shared 

decision-making was 

used and determine 

the                                     (units 

unknown)                                (unknown)

 

                    (unknown) (no date) (unknown) (unknown) Signed By: (units 

unknown)                                (unknown)

 

                      (unknown)  (no date)  (unknown)  (unknown)  Stand Alone Fo

lior: 

Patient Portal/API                      (units 

unknown)                                (unknown)

 

                      (unknown)  (no date)  (unknown)  (unknown)  Stated complai

nt: 

lt eye pain/injury                      (units 

unknown)                                (unknown)

 

                      (unknown)  (no date)  (unknown)  (unknown)  Stop: 23

 

17:36                                   (units 

unknown)                                (unknown)

 

                      (unknown)  (no date)  (unknown)  (unknown)  Technique/Desc

ripti

on of procedure 

performed:                              (units 

unknown)                                (unknown)

 

                      (unknown)  (no date)  (unknown)  (unknown)  Temperature 98

.4 F 

23 14:11                          (units 

unknown)                                (unknown)

 

                    (unknown) (no date) (unknown) (unknown) Temperature 98.4 F (

units 

unknown)                                (unknown)

 

                      (unknown)  (no date)  (unknown)  (unknown)  This is a 

well-appearing but 

anxious 42-year-old 

woman who presents 

with concern                            (units 

unknown)                                (unknown)

 

                      (unknown)  (no date)  (unknown)  (unknown)  Time Seen by 

Provider: 23 

16:40                                   (units 

unknown)                                (unknown)

 

                      (unknown)  (no date)  (unknown)  (unknown)  Treatment and 

disposition                             (units 

unknown)                                (unknown)

 

                    (unknown) (no date) (unknown) (unknown) Vital Signs - 8 hr (

units 

unknown)                                (unknown)

 

                    (unknown) (no date) (unknown) (unknown) Vital Signs (units 

unknown)                                (unknown)

 

                    (unknown) (no date) (unknown) (unknown) Vital signs: (units 

unknown)                                (unknown)

 

                      (unknown)  (no date)  (unknown)  (unknown)  [ ] New medica

tion 

written as a paper 

prescription                            (units 

unknown)                                (unknown)

 

                      (unknown)  (no date)  (unknown)  (unknown)  [ ] No new 

medications given                       (units 

unknown)                                (unknown)

 

                      (unknown)  (no date)  (unknown)  (unknown)  [1 ] New medic

ation 

prescriptions sent 

to your pharmacy: 

[Antibiotic                             (units 

unknown)                                (unknown)

 

                      (unknown)  (no date)  (unknown)  (unknown)  abrasion noted

 at 

the 9 o'clock 

position. Patient 

is placed on 

antibiotic                              (units 

unknown)                                (unknown)

 

                      (unknown)  (no date)  (unknown)  (unknown)  and ED precaut

ions 

provided, follow-up 

plan discussed, all 

questions answered.                     (units 

unknown)                                (unknown)

 

                      (unknown)  (no date)  (unknown)  (unknown)  appointment. L

et 

them know you were 

seen in the 

Emergency 

Department and that 

we                                      (units 

unknown)                                (unknown)

 

                      (unknown)  (no date)  (unknown)  (unknown)  area. She is 

worried she has a 

corneal abrasion. 

She is unsure she 

is had any                              (units 

unknown)                                (unknown)

 

                      (unknown)  (no date)  (unknown)  (unknown)  ask that you b

e 

seen in follow up. 

We will 

electronically 

transmit a record 

of                                      (units 

unknown)                                (unknown)

 

                      (unknown)  (no date)  (unknown)  (unknown)  been having pa

in 

under her eyelid 

with blinking and 

with her eye closed 

when her                                (units 

unknown)                                (unknown)

 

                      (unknown)  (no date)  (unknown)  (unknown)  concerning 

symptoms, such as 

[fever greater than 

101 F, shaking 

chills,                                 (units 

unknown)                                (unknown)

 

                    (unknown) (no date) (unknown) (unknown) corneal abrasion (un

its 

unknown)                                (unknown)

 

                      (unknown)  (no date)  (unknown)  (unknown)  discharge from

 her 

eye she thinks she 

is had a little bit 

of blurriness in 

her                                     (units 

unknown)                                (unknown)

 

                      (unknown)  (no date)  (unknown)  (unknown)  distress, obes

e, 

anxious appearing.                      (units 

unknown)                                (unknown)

 

                      (unknown)  (no date)  (unknown)  (unknown)  drops, advised

 to 

follow up with 

Optometry or 

Ophthalmology, 

return precautions                      (units 

unknown)                                (unknown)

 

                      (unknown)  (no date)  (unknown)  (unknown)  eye moves arou

nd. 

On exam patient 

does have very 

slight swelling and                     (units 

unknown)                                (unknown)

 

                      (unknown)  (no date)  (unknown)  (unknown)  eyedrops polym

yxin 

B sulf 

trimethoprim]                           (units 

unknown)                                (unknown)

 

                      (unknown)  (no date)  (unknown)  (unknown)  fluorescein ex

am 

there is a small 

corneal abrasion at 

approximately the 9                     (units 

unknown)                                (unknown)

 

                    (unknown) (no date) (unknown) (unknown) follow-up. (units 

unknown)                                (unknown)

 

                      (unknown)  (no date)  (unknown)  (unknown)  for possible 

corneal abrasion to 

her left eye 

sustained 3 days 

ago. Patient has                        (units 

unknown)                                (unknown)

 

                      (unknown)  (no date)  (unknown)  (unknown)  history and he

lp 

get you set up with 

a doctor in the 

community.                              (units 

unknown)                                (unknown)

 

                      (unknown)  (no date)  (unknown)  (unknown)  icterus. There

 is 

very slight 

injection of the 

left eye, without 

drainage. On                            (units 

unknown)                                (unknown)

 

                      (unknown)  (no date)  (unknown)  (unknown)  inflammation t

o the 

upper eyelid, on 

fluorescein exam 

she has a small 

corneal                                 (units 

unknown)                                (unknown)

 

                      (unknown)  (no date)  (unknown)  (unknown)  instill dye in

 the 

left eye, black 

light exam under 

magnification 

reveals small                           (units 

unknown)                                (unknown)

 

                      (unknown)  (no date)  (unknown)  (unknown)  like there is 

slight bruising of 

her upper eyelid 

and it feels tender 

in the                                  (units 

unknown)                                (unknown)

 

                      (unknown)  (no date)  (unknown)  (unknown)  o'clock positi

on on 

the left cornea.                        (units 

unknown)                                (unknown)

 

                    (unknown) (no date) (unknown) (unknown) pain with movement. 

(units 

unknown)                                (unknown)

 

                      (unknown)  (no date)  (unknown)  (unknown)  patient's plan

 of 

care in the 

emergency 

department and plan 

for outpatient                          (units 

unknown)                                (unknown)

 

                      (unknown)  (no date)  (unknown)  (unknown)  persistent 

symptoms.                               (units 

unknown)                                (unknown)

 

                      (unknown)  (no date)  (unknown)  (unknown)  polymyxin B 

sulf-trimethoprim 

10,000 unit- 1 

mg/mL drops                             (units 

unknown)                                (unknown)

 

                      (unknown)  (no date)  (unknown)  (unknown)  polymyxin B redd

lfate 

10,000 1 drp 

EYE-LEFT QID 

corneal 23                        (units 

unknown)                                (unknown)

 

                    (unknown) (no date) (unknown) (unknown) rales, or rhonchi. (

units 

unknown)                                (unknown)

 

                      (unknown)  (no date)  (unknown)  (unknown)  symptoms, I 

recommend follow up 

with Optometry or 

Ophthalmology if 

you have                                (units 

unknown)                                (unknown)

 

                      (unknown)  (no date)  (unknown)  (unknown)  the eye by carrie hawley 3 

days ago. Patient 

states that she is 

been having pain 

with                                    (units 

unknown)                                (unknown)

 

                      (unknown)  (no date)  (unknown)  (unknown)  today's note i

f 

your PCP is in our 

system. Please 

monitor for new or 

worsening                               (units 

unknown)                                (unknown)

 

                      (unknown)  (no date)  (unknown)  (unknown)  tonsillar 

hypertrophy or 

exudate. Airway 

patent.                                 (units 

unknown)                                (unknown)

 

                      (unknown)  (no date)  (unknown)  (unknown)  unit-trimethop

rim 1 

mg/mL eye drops 

abrasion 7 days #10 

mL                                      (units 

unknown)                                (unknown)

 

                      (unknown)  (no date)  (unknown)  (unknown)  vision. She de

nies 

any other symptoms 

or complaints 

including 

headaches, eye                          (units 

unknown)                                (unknown)

 

                      (unknown)  (no date)  (unknown)  (unknown)  when she blink

s and 

when she has her 

eyes closed and 

moves around, she 

feels                                   (units 

unknown)                                (unknown)

 

                      (unknown)  (no date)  (unknown)  (unknown)  worsening pain

, 

persistent vomiting 

or other bothersome 

symptoms]                               (units 

unknown)                                (unknown)

 

                                                    Result panel 3 

 

                    (unknown) (no date) (unknown) (unknown) (no value) (units 

unknown)                                (unknown)

 

                      (unknown)  (no date)  (unknown)  (unknown)  <Electronicall

y 

signed by Marita WINKLEROBriana>                           (units 

unknown)                                (unknown)

 

                      (unknown)  (no date)  (unknown)  (unknown)  <Electronicall

y 

signed by CATHI ColemanOBriana>                          (units 

unknown)                                (unknown)

 

                      (unknown)  (no date)  (unknown)  (unknown)  <Electronicall

y 

signed by Angelia Alberts>                          (units 

unknown)                                (unknown)

 

                      (unknown)  (no date)  (unknown)  (unknown)  <Marita lim DO 

- Last Filed: 

23 06:48>                         (units 

unknown)                                (unknown)

 

                      (unknown)  (no date)  (unknown)  (unknown)  <Angelia baig PA-C - Last Filed: 

23 19:28>                         (units 

unknown)                                (unknown)

 

                    (unknown) (no date) (unknown) (unknown) <cosigner> (units 

unknown)                                (unknown)

 

                    (unknown) (no date) (unknown) (unknown) **Misc Procedure (un

its 

unknown)                                (unknown)

 

                      (unknown)  (no date)  (unknown)  (unknown)  *If you do not

 have 

a primary care 

provider please 

contact the [facility]                                (units 

unknown)                                (unknown)

 

                      (unknown)  (no date)  (unknown)  (unknown)  *Please contin

ue to 

take your regular 

medications as 

directed.                               (units 

unknown)                                (unknown)

 

                      (unknown)  (no date)  (unknown)  (unknown)  *Please follow

 up 

with your primary 

care provider in 

2-3 days, call for 

an                                      (units 

unknown)                                (unknown)

 

                      (unknown)  (no date)  (unknown)  (unknown)  *Return to 

Emergency 

Department if you 

should have any 

new, worsening or                       (units 

unknown)                                (unknown)

 

                    (unknown) (no date) (unknown) (unknown) *What to do: (units 

unknown)                                (unknown)

 

                      (unknown)  (no date)  (unknown)  (unknown)  *You have been

 

diagnosed with 

corneal abrasion                        (units 

unknown)                                (unknown)

 

                    (unknown) (no date) (unknown) (unknown) [id number]  (units 

unknown)                                (unknown)

 

                    (unknown) (no date) (unknown) (unknown) 23 1928 (units

 

unknown)                                (unknown)

 

                    (unknown) (no date) (unknown) (unknown) 23  (units 

unknown)                                (unknown)

 

                    (unknown) (no date) (unknown) (unknown) 23 0648 (units

 

unknown)                                (unknown)

 

                      (unknown)  (no date)  (unknown)  (unknown)  1 drp EYE-LEFT

 QID 

7 Days Qty: 10 0RF                      (units 

unknown)                                (unknown)

 

                    (unknown) (no date) (unknown) (unknown) 14:11     (units 

unknown)                                (unknown)

 

                      (unknown)  (no date)  (unknown)  (unknown)  42-year-old wo

man 

presents with 

concern for left 

eye pain after she 

was poked in                            (units 

unknown)                                (unknown)

 

                      (unknown)  (no date)  (unknown)  (unknown)  Activity 

Restrictions/Additi

onal Instructions:                      (units 

unknown)                                (unknown)

 

                      (unknown)  (no date)  (unknown)  (unknown)  After instilla

tion 

of 2 drops of 

proparacaine, 

fluorescein strip 

was used to                             (units 

unknown)                                (unknown)

 

                    (unknown) (no date) (unknown) (unknown) Age/Sex: 42 / F (uni

ts 

unknown)                                (unknown)

 

                    (unknown) (no date) (unknown) (unknown) Allergies (units 

unknown)                                (unknown)

 

                      (unknown)  (no date)  (unknown)  (unknown)  Allergy/AdvRea

c 

Type Severity 

Reaction Status 

Date / Time                             (units 

unknown)                                (unknown)

 

                      (unknown)  (no date)  (unknown)  (unknown)  Blood Pressure

 

142/107 H 23 

14:11                                   (units 

unknown)                                (unknown)

 

                      (unknown)  (no date)  (unknown)  (unknown)  Blood Pressure

 

142/107 H                               (units 

unknown)                                (unknown)

 

                    (unknown) (no date) (unknown) (unknown) Breathing (units 

unknown)                                (unknown)

 

                      (unknown)  (no date)  (unknown)  (unknown)  CARDIOVASCULAR

: 

Regular rate and 

rhythm without 

murmurs, gallops, 

or rubs.                                (units 

unknown)                                (unknown)

 

                      (unknown)  (no date)  (unknown)  (unknown)  Chief complain

t: 

Eye Problems                            (units 

unknown)                                (unknown)

 

                      (unknown)  (no date)  (unknown)  (unknown)  Clinical 

Impression:                             (units 

unknown)                                (unknown)

 

                    (unknown) (no date) (unknown) (unknown) Complications: none 

(units 

unknown)                                (unknown)

 

                    (unknown) (no date) (unknown) (unknown) Corneal abrasion (un

its 

unknown)                                (unknown)

 

                    (unknown) (no date) (unknown) (unknown) Cosign    (units 

unknown)                                (unknown)

 

                    (unknown) (no date) (unknown) (unknown) Course    (units 

unknown)                                (unknown)

 

                      (unknown)  (no date)  (unknown)  (unknown)  : 

8 

Acct:[id number]                         (units 

unknown)                                (unknown)

 

                      (unknown)  (no date)  (unknown)  (unknown)  Date of Servic

e: 

23                                (units 

unknown)                                (unknown)

 

                    (unknown) (no date) (unknown) (unknown) Departure (units 

unknown)                                (unknown)

 

                      (unknown)  (no date)  (unknown)  (unknown)  Differential 

Diagnosis                               (units 

unknown)                                (unknown)

 

                      (unknown)  (no date)  (unknown)  (unknown)  Differential 

diagnosis: Likely 

other (corneal 

abrasion)                               (units 

unknown)                                (unknown)

 

                    (unknown) (no date) (unknown) (unknown) Discharge Plan (unit

s 

unknown)                                (unknown)

 

                      (unknown)  (no date)  (unknown)  (unknown)  Discontinued 

Medications                             (units 

unknown)                                (unknown)

 

                    (unknown) (no date) (unknown) (unknown) Documented By: [facility] (

units 

unknown)                                (unknown)

 

                      (unknown)  (no date)  (unknown)  (unknown)  ED Attending 

Cosignature 

Attestation:                            (units 

unknown)                                (unknown)

 

                      (unknown)  (no date)  (unknown)  (unknown)  ENT: Nose with

out 

bleeding, purulent 

drainage. Throat 

without erythema,                       (units 

unknown)                                (unknown)

 

                      (unknown)  (no date)  (unknown)  (unknown)  ER Physician: 

Angelia Alberts P.A-C                                   (units 

unknown)                                (unknown)

 

                      (unknown)  (no date)  (unknown)  (unknown)  EXTREMITIES: N

o 

edema or joint 

tenderness.                             (units 

unknown)                                (unknown)

 

                      (unknown)  (no date)  (unknown)  (unknown)  EYES: Pupils e

qual 

round and reactive. 

Extraocular motions 

intact. No scleral                      (units 

unknown)                                (unknown)

 

                    (unknown) (no date) (unknown) (unknown) Emergency Report (un

its 

unknown)                                (unknown)

 

                    (unknown) (no date) (unknown) (unknown) Exam Narrative: (uni

ts 

unknown)                                (unknown)

 

                    (unknown) (no date) (unknown) (unknown) Exam      (units 

unknown)                                (unknown)

 

                      (unknown)  (no date)  (unknown)  (unknown)  Fluorescein So

dium 

(Fluorescein 1 Mg 

Strip) 1 mg 

EYE-BOTH NOW ONE                        (units 

unknown)                                (unknown)

 

                      (unknown)  (no date)  (unknown)  (unknown)  Fluorescein ey

e 

exam                                    (units 

unknown)                                (unknown)

 

                      (unknown)  (no date)  (unknown)  (unknown)  GENERAL: 42 ye

ar 

old patient appears 

stated age. 

Well-developed 

patient, in mild                        (units 

unknown)                                (unknown)

 

                    (unknown) (no date) (unknown) (unknown) General   (units 

unknown)                                (unknown)

 

                      (unknown)  (no date)  (unknown)  (unknown)  HEAD: Atraumat

ic. 

Normocephalic.                          (units 

unknown)                                (unknown)

 

                    (unknown) (no date) (unknown) (unknown) HPI - Eye Problem (u

nits 

unknown)                                (unknown)

 

                    (unknown) (no date) (unknown) (unknown) HPI Narrative: (unit

s 

unknown)                                (unknown)

 

                      (unknown)  (no date)  (unknown)  (unknown)  History of Pre

sent 

Illness                                 (units 

unknown)                                (unknown)

 

                      (unknown)  (no date)  (unknown)  (unknown)  I was immediat

ely 

available in the 

department for 

consultation. 

Documentation                           (units 

unknown)                                (unknown)

 

                    (unknown) (no date) (unknown) (unknown) Initial Vital Signs 

(units 

unknown)                                (unknown)

 

                      (unknown)  (no date)  (unknown)  (unknown)  Initial Vital 

Signs:                                  (units 

unknown)                                (unknown)

 

                      (unknown)  (no date)  (unknown)  (unknown)  [address] [address]                     (units 

unknown)                                (unknown)

 

                      (unknown)  (no date)  (unknown)  (unknown)  Nikolai Simms MD

 

[Primary Care 

Provider]                               (units 

unknown)                                (unknown)

 

                      (unknown)  (no date)  (unknown)  (unknown)  Last Admin: 

23 18:39 

Dose: 1 drop                            (units 

unknown)                                (unknown)

 

                      (unknown)  (no date)  (unknown)  (unknown)  Last Admin: 

23 18:40 

Dose: 1 mg                              (units 

unknown)                                (unknown)

 

                    (unknown) (no date) (unknown) (unknown) Left eye  (units 

unknown)                                (unknown)

 

                    (unknown) (no date) (unknown) (unknown) Location: (units 

unknown)                                (unknown)

 

                    (unknown) (no date) (unknown) (unknown) MDM - Eye Problem (u

nits 

unknown)                                (unknown)

 

                    (unknown) (no date) (unknown) (unknown) MDM Narrative (units

 

unknown)                                (unknown)

 

                      (unknown)  (no date)  (unknown)  (unknown)  Medical decisi

on 

making narrative:                       (units 

unknown)                                (unknown)

 

                      (unknown)  (no date)  (unknown)  (unknown)  Medication 

Instructions 

Recorded                                (units 

unknown)                                (unknown)

 

                      (unknown)  (no date)  (unknown)  (unknown)  NECK: Trachea 

midline. Non tender                     (units 

unknown)                                (unknown)

 

                    (unknown) (no date) (unknown) (unknown) NEURO: AOx3. (units 

unknown)                                (unknown)

 

                    (unknown) (no date) (unknown) (unknown) Name of Procedure: (

units 

unknown)                                (unknown)

 

                    (unknown) (no date) (unknown) (unknown) Narrative (units 

unknown)                                (unknown)

 

                    (unknown) (no date) (unknown) (unknown) Narrative: (units 

unknown)                                (unknown)

 

                    (unknown) (no date) (unknown) (unknown) New       (units 

unknown)                                (unknown)

 

                    (unknown) (no date) (unknown) (unknown) Ordered:  (units 

unknown)                                (unknown)

 

                    (unknown) (no date) (unknown) (unknown) Orders    (units 

unknown)                                (unknown)

 

                      (unknown)  (no date)  (unknown)  (unknown)  Oxygen Deliver

y 

Method Room Air 

23 14:11                          (units 

unknown)                                (unknown)

 

                      (unknown)  (no date)  (unknown)  (unknown)  Oxygen Deliver

y 

Method Room Air                         (units 

unknown)                                (unknown)

 

                      (unknown)  (no date)  (unknown)  (unknown)  Patient 

Disposition: Home                       (units 

unknown)                                (unknown)

 

                      (unknown)  (no date)  (unknown)  (unknown)  Patient tolera

ricarda 

procedure: Well                         (units 

unknown)                                (unknown)

 

                      (unknown)  (no date)  (unknown)  (unknown)  Patient: 

Leigh Ann Ghotra 

MR#: [id number]                                 (units 

unknown)                                (unknown)

 

                      (unknown)  (no date)  (unknown)  (unknown)  [place] Al

nasgy 

Severe Difficulty 

Verified 23 

18:41                                   (units 

unknown)                                (unknown)

 

                    (unknown) (no date) (unknown) (unknown) Prescriptions: (unit

s 

unknown)                                (unknown)

 

                    (unknown) (no date) (unknown) (unknown) Previous Rx's (units

 

unknown)                                (unknown)

 

                    (unknown) (no date) (unknown) (unknown) Procedures (units 

unknown)                                (unknown)

 

                      (unknown)  (no date)  (unknown)  (unknown)  Proparacaine H

Cl 

(Proparacaine 0.5% 

Ophth Sol) 1 drops 

EYE-BOTH NOW ONE                        (units 

unknown)                                (unknown)

 

                      (unknown)  (no date)  (unknown)  (unknown)  Pulse Oximetry

 98 

23 14:11                          (units 

unknown)                                (unknown)

 

                    (unknown) (no date) (unknown) (unknown) Pulse Oximetry 98 (u

nits 

unknown)                                (unknown)

 

                      (unknown)  (no date)  (unknown)  (unknown)  Pulse Rate 81 

23 14:11                          (units 

unknown)                                (unknown)

 

                    (unknown) (no date) (unknown) (unknown) Pulse Rate 81 (units

 

unknown)                                (unknown)

 

                      (unknown)  (no date)  (unknown)  (unknown)  RESPIRATORY: C

lear 

to auscultation. 

Breath sounds equal 

bilaterally. No 

wheezes,                                (units 

unknown)                                (unknown)

 

                    (unknown) (no date) (unknown) (unknown) Referrals: (units 

unknown)                                (unknown)

 

                    (unknown) (no date) (unknown) (unknown) Related Data (units 

unknown)                                (unknown)

 

                      (unknown)  (no date)  (unknown)  (unknown)  Resource line 

at 

[phone]. They 

will ask some 

questions about 

your medical                            (units 

unknown)                                (unknown)

 

                      (unknown)  (no date)  (unknown)  (unknown)  Respiratory Ra

te 16 

23 14:11                          (units 

unknown)                                (unknown)

 

                    (unknown) (no date) (unknown) (unknown) Respiratory Rate 16 

(units 

unknown)                                (unknown)

 

                    (unknown) (no date) (unknown) (unknown) Review of Systems (u

nits 

unknown)                                (unknown)

 

                      (unknown)  (no date)  (unknown)  (unknown)  SKIN: No rash 

or 

erythema of visible 

areas                                   (units 

unknown)                                (unknown)

 

                    (unknown) (no date) (unknown) (unknown) See HPI   (units 

unknown)                                (unknown)

 

                      (unknown)  (no date)  (unknown)  (unknown)  Shared decisio

n 

making:: Shared 

decision-making was 

used and determine 

the                                     (units 

unknown)                                (unknown)

 

                    (unknown) (no date) (unknown) (unknown) Signed By: (units 

unknown)                                (unknown)

 

                      (unknown)  (no date)  (unknown)  (unknown)  Stand Alone Fo

lior: 

Patient Portal/API                      (units 

unknown)                                (unknown)

 

                      (unknown)  (no date)  (unknown)  (unknown)  Stated complai

nt: 

lt eye pain/injury                      (units 

unknown)                                (unknown)

 

                      (unknown)  (no date)  (unknown)  (unknown)  Stop: 23

 

17:36                                   (units 

unknown)                                (unknown)

 

                      (unknown)  (no date)  (unknown)  (unknown)  Technique/Desc

ripti

on of procedure 

performed:                              (units 

unknown)                                (unknown)

 

                      (unknown)  (no date)  (unknown)  (unknown)  Temperature 98

.4 F 

23 14:11                          (units 

unknown)                                (unknown)

 

                    (unknown) (no date) (unknown) (unknown) Temperature 98.4 F (

units 

unknown)                                (unknown)

 

                      (unknown)  (no date)  (unknown)  (unknown)  This is a 

well-appearing but 

anxious 42-year-old 

woman who presents 

with concern                            (units 

unknown)                                (unknown)

 

                      (unknown)  (no date)  (unknown)  (unknown)  Time Seen by 

Provider: 23 

16:40                                   (units 

unknown)                                (unknown)

 

                      (unknown)  (no date)  (unknown)  (unknown)  Treatment and 

disposition                             (units 

unknown)                                (unknown)

 

                    (unknown) (no date) (unknown) (unknown) Vital Signs - 8 hr (

units 

unknown)                                (unknown)

 

                    (unknown) (no date) (unknown) (unknown) Vital Signs (units 

unknown)                                (unknown)

 

                    (unknown) (no date) (unknown) (unknown) Vital signs: (units 

unknown)                                (unknown)

 

                      (unknown)  (no date)  (unknown)  (unknown)  [ ] New medica

tion 

written as a paper 

prescription                            (units 

unknown)                                (unknown)

 

                      (unknown)  (no date)  (unknown)  (unknown)  [ ] No new 

medications given                       (units 

unknown)                                (unknown)

 

                      (unknown)  (no date)  (unknown)  (unknown)  [1 ] New medic

ation 

prescriptions sent 

to your pharmacy: 

[Antibiotic                             (units 

unknown)                                (unknown)

 

                      (unknown)  (no date)  (unknown)  (unknown)  abrasion noted

 at 

the 9 o'clock 

position. Patient 

is placed on 

antibiotic                              (units 

unknown)                                (unknown)

 

                      (unknown)  (no date)  (unknown)  (unknown)  and ED precaut

ions 

provided, follow-up 

plan discussed, all 

questions answered.                     (units 

unknown)                                (unknown)

 

                      (unknown)  (no date)  (unknown)  (unknown)  appointment. L

et 

them know you were 

seen in the 

Emergency 

Department and that 

we                                      (units 

unknown)                                (unknown)

 

                      (unknown)  (no date)  (unknown)  (unknown)  area. She is 

worried she has a 

corneal abrasion. 

She is unsure she 

is had any                              (units 

unknown)                                (unknown)

 

                      (unknown)  (no date)  (unknown)  (unknown)  ask that you b

e 

seen in follow up. 

We will 

electronically 

transmit a record 

of                                      (units 

unknown)                                (unknown)

 

                      (unknown)  (no date)  (unknown)  (unknown)  been having pa

in 

under her eyelid 

with blinking and 

with her eye closed 

when her                                (units 

unknown)                                (unknown)

 

                      (unknown)  (no date)  (unknown)  (unknown)  concerning 

symptoms, such as 

[fever greater than 

101 F, shaking 

chills,                                 (units 

unknown)                                (unknown)

 

                    (unknown) (no date) (unknown) (unknown) corneal abrasion (un

its 

unknown)                                (unknown)

 

                      (unknown)  (no date)  (unknown)  (unknown)  discharge from

 her 

eye she thinks she 

is had a little bit 

of blurriness in 

her                                     (units 

unknown)                                (unknown)

 

                      (unknown)  (no date)  (unknown)  (unknown)  distress, obes

e, 

anxious appearing.                      (units 

unknown)                                (unknown)

 

                      (unknown)  (no date)  (unknown)  (unknown)  drops, advised

 to 

follow up with 

Optometry or 

Ophthalmology, 

return precautions                      (units 

unknown)                                (unknown)

 

                      (unknown)  (no date)  (unknown)  (unknown)  eye moves arou

nd. 

On exam patient 

does have very 

slight swelling and                     (units 

unknown)                                (unknown)

 

                      (unknown)  (no date)  (unknown)  (unknown)  eyedrops polym

yxin 

B sulf 

trimethoprim]                           (units 

unknown)                                (unknown)

 

                      (unknown)  (no date)  (unknown)  (unknown)  fluorescein ex

am 

there is a small 

corneal abrasion at 

approximately the 9                     (units 

unknown)                                (unknown)

 

                    (unknown) (no date) (unknown) (unknown) follow-up. (units 

unknown)                                (unknown)

 

                      (unknown)  (no date)  (unknown)  (unknown)  for possible 

corneal abrasion to 

her left eye 

sustained 3 days 

ago. Patient has                        (units 

unknown)                                (unknown)

 

                    (unknown) (no date) (unknown) (unknown) has been reviewed. (

units 

unknown)                                (unknown)

 

                      (unknown)  (no date)  (unknown)  (unknown)  history and he

lp 

get you set up with 

a doctor in the 

community.                              (units 

unknown)                                (unknown)

 

                      (unknown)  (no date)  (unknown)  (unknown)  icterus. There

 is 

very slight 

injection of the 

left eye, without 

drainage. On                            (units 

unknown)                                (unknown)

 

                      (unknown)  (no date)  (unknown)  (unknown)  inflammation t

o the 

upper eyelid, on 

fluorescein exam 

she has a small 

corneal                                 (units 

unknown)                                (unknown)

 

                      (unknown)  (no date)  (unknown)  (unknown)  instill dye in

 the 

left eye, black 

light exam under 

magnification 

reveals small                           (units 

unknown)                                (unknown)

 

                      (unknown)  (no date)  (unknown)  (unknown)  like there is 

slight bruising of 

her upper eyelid 

and it feels tender 

in the                                  (units 

unknown)                                (unknown)

 

                      (unknown)  (no date)  (unknown)  (unknown)  o'clock positi

on on 

the left cornea.                        (units 

unknown)                                (unknown)

 

                    (unknown) (no date) (unknown) (unknown) pain with movement. 

(units 

unknown)                                (unknown)

 

                      (unknown)  (no date)  (unknown)  (unknown)  patient's plan

 of 

care in the 

emergency 

department and plan 

for outpatient                          (units 

unknown)                                (unknown)

 

                      (unknown)  (no date)  (unknown)  (unknown)  persistent 

symptoms.                               (units 

unknown)                                (unknown)

 

                      (unknown)  (no date)  (unknown)  (unknown)  polymyxin B 

sulf-trimethoprim 

10,000 unit- 1 

mg/mL drops                             (units 

unknown)                                (unknown)

 

                      (unknown)  (no date)  (unknown)  (unknown)  polymyxin B redd

lfate 

10,000 1 drp 

EYE-LEFT QID 

corneal 23                        (units 

unknown)                                (unknown)

 

                    (unknown) (no date) (unknown) (unknown) rales, or rhonchi. (

units 

unknown)                                (unknown)

 

                      (unknown)  (no date)  (unknown)  (unknown)  symptoms, I 

recommend follow up 

with Optometry or 

Ophthalmology if 

you have                                (units 

unknown)                                (unknown)

 

                      (unknown)  (no date)  (unknown)  (unknown)  the eye by carrie hawley 3 

days ago. Patient 

states that she is 

been having pain 

with                                    (units 

unknown)                                (unknown)

 

                      (unknown)  (no date)  (unknown)  (unknown)  today's note i

f 

your PCP is in our 

system. Please 

monitor for new or 

worsening                               (units 

unknown)                                (unknown)

 

                      (unknown)  (no date)  (unknown)  (unknown)  tonsillar 

hypertrophy or 

exudate. Airway 

patent.                                 (units 

unknown)                                (unknown)

 

                      (unknown)  (no date)  (unknown)  (unknown)  unit-trimethop

rim 1 

mg/mL eye drops 

abrasion 7 days #10 

mL                                      (units 

unknown)                                (unknown)

 

                      (unknown)  (no date)  (unknown)  (unknown)  vision. She de

nies 

any other symptoms 

or complaints 

including 

headaches, eye                          (units 

unknown)                                (unknown)

 

                      (unknown)  (no date)  (unknown)  (unknown)  when she blink

s and 

when she has her 

eyes closed and 

moves around, she 

feels                                   (units 

unknown)                                (unknown)

 

                      (unknown)  (no date)  (unknown)  (unknown)  worsening pain

, 

persistent vomiting 

or other bothersome 

symptoms]                               (units 

unknown)                                (unknown)







Social History





                                date            description     facility

 

                                2023 00:00 Unknown if [facility]







Vital Signs





                          date         measurement  value        units

 

                          2023 00:00 BMI          40.3         kg/m2

 

                          2023 00:00 BP_diastolic 107          mmHg

 

                          2023 00:00 BP_systolic  142          mmHg

 

                          2023 00:00 heart_rate   81           /min

 

                          2023 00:00 height_metric 172.72       cm

 

                          2023 00:00 height_standard 68           in

 

                          2023 00:00 o2_saturation 98           %

 

                          2023 00:00 respiration_rate 16           /min

 

                          2023 00:00 temperature_metric 36.89        C

 

                          2023 00:00 temperature_standard 98.4         F

 

                          2023 00:00 weight_metric 120.2        kg

 

                          2023 00:00 weight_standard 265          lb

## 2023-07-06 NOTE — ED PHYSICIAN DOCUMENTATION
PD HPI ABD PAIN





- Stated complaint


Stated Complaint: NAUSEA





- Chief complaint


Chief Complaint: General





- History obtained from


History obtained from: Patient





- Additional information


Additional information: 


Patient is a 42-year-old female presenting for evaluation of nausea and 1 

episode of emesis that started this morning.  Patient has a history of chronic 

abdominal pain and nausea.  She was recently seen here with similar symptoms and

given a prescription for Zofran and Phenergan suppositories.  She states that 

she is not able to really use the suppositories and the Zofran did not help her.

She denies fever, cough, chest pain, difficulty breathing, abdominal pain, 

dysuria.  She denies concerns for pregnancy.  She does admit to regular 

marijuana use, she states daily.  She says that this has been ongoing for at 

least 4 years but that her nausea and other symptoms have been ongoing for 

longer than that.








Review of Systems


Constitutional: denies: Fever


Cardiac: denies: Chest pain / pressure


Respiratory: denies: Dyspnea


GI: reports: Nausea, Vomiting.  denies: Abdominal Pain, Diarrhea


: denies: Dysuria





PD PAST MEDICAL HISTORY





- Past Medical History


Past Medical History: Yes


Cardiovascular: Arrhythmia


Respiratory: Asthma


Neuro: Head injury, Migraines


Endocrine/Autoimmune: None


GI: Chronic diarrhea, Other


GYN: Ovarian cysts


: Other


HEENT: None


Psych: None


Musculoskeletal: Osteoarthritis, Fibromyalgia


Derm: Psoriasis





- Past Surgical History


Past Surgical History: Yes


General: Appendectomy


Ortho: Arthroscopic surgery


HEENT: Other





- Present Medications


Home Medications: 


                                Ambulatory Orders











 Medication  Instructions  Recorded  Confirmed


 


Albuterol 2.5 mg INH Q4H PRN 09/08/13 01/13/23


 


Cetirizine [ZyrTEC] 10 mg PO DAILY 09/08/13 01/13/23


 


Dicyclomine [Bentyl] 20 mg PO QID 09/08/13 01/13/23


 


Diphenoxylate HCl/Atropine 1 each PO Q4HR 09/08/13 01/13/23





[Lomotil Tablet]   


 


Multivitamin,Ther and Minerals 1 each PO DAILY 09/08/13 01/13/23





[Vitamin and Minerals]   


 


Ondansetron Oral Soln [Zofran] 4 mg PO Q6HR PRN 09/08/13 01/13/23


 


atenoloL [Tenormin] 50 mg PO DAILY 09/08/13 01/13/23


 


Oxycodone HCl/Acetaminophen 1 - 2 each PO Q6H PRN #10 tablet 06/06/14 01/13/23





[Percocet 5-325 mg Tablet]   


 


Gabapentin [Neurontin] 600 mg PO TID 01/13/23 01/13/23


 


LORazepam [Ativan] 0.5 mg PO PRN 01/13/23 


 


methocarbamoL [Methocarbamol] 750 mg PO TID 01/13/23 01/13/23


 


Ondansetron HCl 4 mg PO Q8H PRN #30 tablet 06/29/23 


 


Promethazine Supp [Phenergan Supp] 25 mg MA Q6H PRN #10 supp 06/29/23 














- Allergies


Allergies/Adverse Reactions: 


                                    Allergies











Allergy/AdvReac Type Severity Reaction Status Date / Time


 


Penicillins Allergy Intermediate Hives Verified 07/06/23 08:27


 


codeine [Codeine] Allergy  Hives Verified 07/06/23 08:27














- Social History


Does the pt smoke?: No


Smoking Status: Never smoker


Does the pt drink ETOH?: No


Does the pt have substance abuse?: No





- Immunizations


Immunizations are current?: Yes





- POLST


Patient has POLST: No





PD ED PE NORMAL





- General


General: Alert and oriented X 3, No acute distress, Well developed/nourished





- HEENT


HEENT: Atraumatic





- Neck


Neck: Supple, no meningeal sign





- Cardiac


Cardiac: RRR, No murmur





- Respiratory


Respiratory: No respiratory distress, Clear bilaterally





- Abdomen


Abdomen: Normal bowel sounds, Soft, Non tender, Non distended





- Derm


Derm: Warm and dry





- Neuro


Neuro: Normal speech





Results





- Vitals


Vitals: 


                               Vital Signs - 24 hr











  07/06/23 07/06/23





  08:24 10:14


 


Temperature 36.1 C L 


 


Heart Rate 71 70


 


Respiratory 22 18





Rate  


 


Blood Pressure 149/87 H 142/66 H


 


O2 Saturation 100 97








                                     Oxygen











O2 Source                      Room air

















- Labs


Labs: 


                                Laboratory Tests











  07/06/23 07/06/23 07/06/23





  08:30 08:30 08:30


 


WBC  7.1  


 


RBC  4.59  


 


Hgb  13.5  


 


Hct  40.5  


 


MCV  88.2  


 


MCH  29.4  


 


MCHC  33.3  


 


RDW  11.8 L  


 


Plt Count  194  


 


MPV  9.4  


 


Neut # (Auto)  5.2  


 


Lymph # (Auto)  1.5  


 


Mono # (Auto)  0.3  


 


Eos # (Auto)  0.1  


 


Baso # (Auto)  0.0  


 


Absolute Nucleated RBC  0.00  


 


Nucleated RBC %  0.0  


 


Sodium   138 


 


Potassium   4.1 


 


Chloride   105 


 


Carbon Dioxide   25 


 


Anion Gap   8.0 


 


BUN   14 


 


Creatinine   0.8 


 


Estimated GFR (MDRD)   78 L 


 


Glucose   134 H 


 


Calcium   9.1 


 


Total Bilirubin   0.4 


 


AST   17 


 


ALT   19 


 


Alkaline Phosphatase   57 


 


Total Protein   7.1 


 


Albumin   4.3 


 


Globulin   2.8 


 


Albumin/Globulin Ratio   1.5 


 


Lipase   25 


 


Serum HCG, Qual    NEGATIVE














PD Medical Decision Making





- ED course


Complexity details: reviewed results, re-evaluated patient, d/w patient


ED course: 





1030 - She reports that she is feeling better.  Has an appointment with her PCP 

today at noon.  Requesting discharge.





Patient is a 42-year-old female presenting for evaluation of nausea and 

vomiting.  Patient reports a long history of similar symptoms with a recent 

presentation last month for the same.  Her vital signs are stable.  Her ab

dominal exam is benign.  CBC and chemistries were obtained and reviewed without 

any significant abnormalities.  Patient was given IV fluids, droperidol.  She 

did have some improvement in her symptoms but was also feeling restless so given

 a dose of IV Ativan.  She is feeling much better with these medications and no 

longer feels nauseous.She was counseled regarding her cannabis use as this may 

be an exacerbating factor.  She has a PCP appointment this afternoon which she 

would like to go to.Patient counseled on concerning symptoms to return for.








Departure





- Departure


Disposition: 01 Home, Self Care


Clinical Impression: 


 Nausea & vomiting





Condition: Stable


Instructions:  ED Nausea Vomiting


Follow-Up: 


Benson Culp MD [Provider Admit Priv/Credential] - 


Comments: 


Please have close follow-up with your primary care provider.  I would also 

encourage you to stop your marijuana use as this may also be a contributing 

factor and be making your episodes worse.Use the medications you have been 

previously prescribed as directed.  You develop any new or worsening symptoms 

please return to the emergency department.


Discharge Date/Time: 07/06/23 10:37

## 2023-07-11 ENCOUNTER — HOSPITAL ENCOUNTER (OUTPATIENT)
Dept: HOSPITAL 76 - EMS | Age: 43
Discharge: ANOTHER HOSPITAL | End: 2023-07-11
Payer: MEDICARE

## 2023-07-11 DIAGNOSIS — R11.2: Primary | ICD-10-CM

## 2023-07-11 DIAGNOSIS — F41.9: ICD-10-CM

## 2023-07-21 ENCOUNTER — HOSPITAL ENCOUNTER (EMERGENCY)
Dept: HOSPITAL 76 - ED | Age: 43
Discharge: HOME | End: 2023-07-21
Payer: MEDICARE

## 2023-07-21 VITALS — DIASTOLIC BLOOD PRESSURE: 78 MMHG | SYSTOLIC BLOOD PRESSURE: 148 MMHG

## 2023-07-21 DIAGNOSIS — R19.7: ICD-10-CM

## 2023-07-21 DIAGNOSIS — R11.0: Primary | ICD-10-CM

## 2023-07-21 LAB
ALBUMIN DIAFP-MCNC: 4.5 G/DL (ref 3.2–5.5)
ALBUMIN/GLOB SERPL: 1.8 {RATIO} (ref 1–2.2)
ALP SERPL-CCNC: 50 IU/L (ref 42–121)
ALT SERPL W P-5'-P-CCNC: 10 IU/L (ref 10–60)
ANION GAP SERPL CALCULATED.4IONS-SCNC: 7 MMOL/L (ref 6–13)
AST SERPL W P-5'-P-CCNC: 12 IU/L (ref 10–42)
BASOPHILS NFR BLD AUTO: 0 10^3/UL (ref 0–0.1)
BASOPHILS NFR BLD AUTO: 0.5 %
BILIRUB BLD-MCNC: 0.3 MG/DL (ref 0.2–1)
BUN SERPL-MCNC: 15 MG/DL (ref 6–20)
CALCIUM UR-MCNC: 9.4 MG/DL (ref 8.5–10.3)
CHLORIDE SERPL-SCNC: 105 MMOL/L (ref 101–111)
CO2 SERPL-SCNC: 27 MMOL/L (ref 21–32)
CREAT SERPLBLD-SCNC: 0.8 MG/DL (ref 0.6–1.3)
EOSINOPHIL # BLD AUTO: 0.2 10^3/UL (ref 0–0.7)
EOSINOPHIL NFR BLD AUTO: 3.3 %
ERYTHROCYTE [DISTWIDTH] IN BLOOD BY AUTOMATED COUNT: 12 % (ref 12–15)
GFRSERPLBLD MDRD-ARVRAT: 78 ML/MIN/{1.73_M2} (ref 89–?)
GLOBULIN SER-MCNC: 2.5 G/DL (ref 2.1–4.2)
GLUCOSE SERPL-MCNC: 112 MG/DL (ref 74–104)
HCT VFR BLD AUTO: 39.6 % (ref 37–47)
HGB UR QL STRIP: 13.2 G/DL (ref 12–16)
LIPASE SERPL-CCNC: 51 U/L (ref 11–82)
LYMPHOCYTES # SPEC AUTO: 2.3 10^3/UL (ref 1.5–3.5)
LYMPHOCYTES NFR BLD AUTO: 37.3 %
MCH RBC QN AUTO: 29.7 PG (ref 27–31)
MCHC RBC AUTO-ENTMCNC: 33.3 G/DL (ref 32–36)
MCV RBC AUTO: 89.2 FL (ref 81–99)
MONOCYTES # BLD AUTO: 0.5 10^3/UL (ref 0–1)
MONOCYTES NFR BLD AUTO: 8.7 %
NEUTROPHILS # BLD AUTO: 3 10^3/UL (ref 1.5–6.6)
NEUTROPHILS # SNV AUTO: 6.1 X10^3/UL (ref 4.8–10.8)
NEUTROPHILS NFR BLD AUTO: 50 %
NRBC # BLD AUTO: 0 /100WBC
NRBC # BLD AUTO: 0 X10^3/UL
PDW BLD AUTO: 8.9 FL (ref 7.9–10.8)
PLATELET # BLD: 243 10^3/UL (ref 130–450)
POTASSIUM SERPL-SCNC: 3.7 MMOL/L (ref 3.5–4.5)
PROT SPEC-MCNC: 7 G/DL (ref 6.4–8.9)
RBC MAR: 4.44 10^6/UL (ref 4.2–5.4)
SODIUM SERPLBLD-SCNC: 139 MMOL/L (ref 135–145)

## 2023-07-21 PROCEDURE — 83690 ASSAY OF LIPASE: CPT

## 2023-07-21 PROCEDURE — 96376 TX/PRO/DX INJ SAME DRUG ADON: CPT

## 2023-07-21 PROCEDURE — 99283 EMERGENCY DEPT VISIT LOW MDM: CPT

## 2023-07-21 PROCEDURE — 96361 HYDRATE IV INFUSION ADD-ON: CPT

## 2023-07-21 PROCEDURE — 80053 COMPREHEN METABOLIC PANEL: CPT

## 2023-07-21 PROCEDURE — 96374 THER/PROPH/DIAG INJ IV PUSH: CPT

## 2023-07-21 PROCEDURE — 85025 COMPLETE CBC W/AUTO DIFF WBC: CPT

## 2023-07-21 PROCEDURE — 36415 COLL VENOUS BLD VENIPUNCTURE: CPT

## 2023-07-21 NOTE — ED PHYSICIAN DOCUMENTATION
History of Present Illness





- Stated complaint


Stated Complaint: vomiting





- Chief complaint


Chief Complaint: Abd Pain





- History obtained from


History obtained from: Patient, Family





- Additonal information


Additional information: 





42yF with cyclic vomiting syndrome presents to the ED with nausea, abdominal 

pain, and diarrhea. patient has been waking up early to go fishing and family 

member states she may be overexerting herself. her mother is also about to 

undergo surgery and this has caused some strain on the patient. denies fever, 

urinary sx, blood in the stool.





PD PAST MEDICAL HISTORY





- Past Medical History


Past Medical History: Yes


Cardiovascular: Arrhythmia


Respiratory: Asthma


Neuro: Head injury, Migraines


Endocrine/Autoimmune: None


GI: Chronic diarrhea, Other


GYN: Ovarian cysts


: Other


HEENT: None


Psych: None


Musculoskeletal: Osteoarthritis, Fibromyalgia


Derm: Psoriasis





- Past Surgical History


Past Surgical History: Yes


General: Appendectomy


Ortho: Arthroscopic surgery


HEENT: Other





- Present Medications


Home Medications: 


                                Ambulatory Orders











 Medication  Instructions  Recorded  Confirmed


 


Albuterol 2.5 mg INH Q4H PRN 09/08/13 01/13/23


 


Cetirizine [ZyrTEC] 10 mg PO DAILY 09/08/13 01/13/23


 


Dicyclomine [Bentyl] 20 mg PO QID 09/08/13 01/13/23


 


Diphenoxylate HCl/Atropine 1 each PO Q4HR 09/08/13 01/13/23





[Lomotil Tablet]   


 


Multivitamin,Ther and Minerals 1 each PO DAILY 09/08/13 01/13/23





[Vitamin and Minerals]   


 


Ondansetron Oral Soln [Zofran] 4 mg PO Q6HR PRN 09/08/13 01/13/23


 


atenoloL [Tenormin] 50 mg PO DAILY 09/08/13 01/13/23


 


Oxycodone HCl/Acetaminophen 1 - 2 each PO Q6H PRN #10 tablet 06/06/14 01/13/23





[Percocet 5-325 mg Tablet]   


 


Gabapentin [Neurontin] 600 mg PO TID 01/13/23 01/13/23


 


LORazepam [Ativan] 0.5 mg PO PRN 01/13/23 


 


methocarbamoL [Methocarbamol] 750 mg PO TID 01/13/23 01/13/23


 


Ondansetron HCl 4 mg PO Q8H PRN #30 tablet 06/29/23 


 


Promethazine Supp [Phenergan Supp] 25 mg [place] Q6H PRN #10 supp 06/29/23 


 


Promethazine [Phenergan] 25 mg PO Q6H PRN #10 tab 07/11/23 


 


Metoclopramide [Reglan] 10 mg PO TID PRN #30 tablet 07/14/23 


 


Amitriptyline [Elavil] 25 mg PO HS #30 tablet 07/21/23 














- Allergies


Allergies/Adverse Reactions: 


                                    Allergies











Allergy/AdvReac Type Severity Reaction Status Date / Time


 


Penicillins Allergy Intermediate Hives Verified 07/21/23 05:34


 


codeine [Codeine] Allergy  Hives Verified 07/21/23 05:34














- Social History


Does the pt smoke?: No


Smoking Status: Never smoker


Does the pt drink ETOH?: No


Does the pt have substance abuse?: No





- Immunizations


Immunizations are current?: Yes





- POLST


Patient has POLST: No





PD ED PE NORMAL





- Vitals


Vital signs reviewed: Yes





- General


General: Alert and oriented X 3, No acute distress, Well developed/nourished, 

Other (Patient is wearing dark sunglasses, intermittently speaking slowly, with 

slurred speech, in sign language, and then speaking normally)





- HEENT


HEENT: Atraumatic, PERRL, EOMI, Moist mucous membranes, Pharynx benign





- Neck


Neck: Supple, no meningeal sign





- Cardiac


Cardiac: RRR





- Respiratory


Respiratory: No respiratory distress, Clear bilaterally





- Abdomen


Abdomen: Non tender, Non distended





- Derm


Derm: Normal color, Warm and dry





- Extremities


Extremities: No deformity





- Neuro


Neuro: Alert and oriented X 3





- Psych


Psych: Other (bizarre affect)





Results





- Vitals


Vitals: 





                               Vital Signs - 24 hr











  07/21/23 07/21/23





  05:31 06:00


 


Temperature 36.4 C L 


 


Heart Rate 91 67


 


Respiratory 20 18





Rate  


 


Blood Pressure 146/72 H 113/63


 


O2 Saturation 100 98








                                     Oxygen











O2 Source                      Room air

















- Labs


Labs: 





                                Laboratory Tests











  07/21/23 07/21/23





  05:43 05:43


 


WBC  6.1 


 


RBC  4.44 


 


Hgb  13.2 


 


Hct  39.6 


 


MCV  89.2 


 


MCH  29.7 


 


MCHC  33.3 


 


RDW  12.0 


 


Plt Count  243 


 


MPV  8.9 


 


Neut # (Auto)  3.0 


 


Lymph # (Auto)  2.3 


 


Mono # (Auto)  0.5 


 


Eos # (Auto)  0.2 


 


Baso # (Auto)  0.0 


 


Absolute Nucleated RBC  0.00 


 


Nucleated RBC %  0.0 


 


Sodium   139


 


Potassium   3.7


 


Chloride   105


 


Carbon Dioxide   27


 


Anion Gap   7.0


 


BUN   15


 


Creatinine   0.8


 


Estimated GFR (MDRD)   78 L


 


Glucose   112 H


 


Calcium   9.4


 


Total Bilirubin   0.3


 


AST   12


 


ALT   10


 


Alkaline Phosphatase   50


 


Total Protein   7.0


 


Albumin   4.5


 


Globulin   2.5


 


Albumin/Globulin Ratio   1.8


 


Lipase   51














PD Medical Decision Making





- ED course


ED course: 





42yF seen in the ED on multiple occasions in past couple weeks for nausea, 

abdominal pain and diarrhea possibly related to cyclic vomiting syndrome 

presents with the same again today. patient took compazine at home and is 

requesting only fluids and zofran. labs including cbc, abdominal panel benign. 

patient was feeling better after fluids but became nauseous again. another 250cc

 bolus was ordered. at family's request an rx was sent for amitryptaline in case

 this is related to complex migraine and she will f/u with her pcp to discuss. 

We also discussed that she can benefit from following up with her mental health 

counselor. return precautions given. 





Departure





- Departure


Clinical Impression: 


 Diarrhea, Nausea





Condition: Stable


Prescriptions: 


Amitriptyline [Elavil] 25 mg PO HS #30 tablet


Comments: 


You were seen in the ED for nausea, abdominal pain, and diarrhea. Please follow 

up with your primary care provider and mental health counselor. Prescription for

 amytriptaline was sent electronically to [facility]. You should be aware 

that this medication usually takes at least a month to reach a steady state in 

the brain and effect change, and needs to be taken daily without missing doses. 

Do not stop the medication without consulting with a physician. Return to the ED

 if you have other concerns.

## 2023-07-21 NOTE — EXTERNAL MEDICAL SUMMARY RPT
Continuity of Care Document



                            Created on: 2023





FARIBA RAHMAN

External Reference #: [id number]

: 1980

Sex: Female



Demographics





                                        Address             [address] [place]  [address]

 

                                        Phone               Unavailable

 

                                        Preferred Language  English

 

                                        Marital Status      Never 

 

                                        Anabaptism Affiliation Unknown

 

                                        Race                Unknown

 

                                        Ethnic Group        Unknown





Author





                                        Name                Unknown

 

                                        [place]  [address]

 

                                        Phone               [phone]

 

                                        Organization        [employer]

 

                                        [place]  [address]

 

                                        Phone               [phone]





Care Team Providers





                                Care Team Member Name Role            Phone

 

                                Nikolai Simms       Unavailable     Unavailable







Allergies and Intolerances





                      date       description facility   reaction   severity

 

                      (no date)  [facility] (no reaction) (no s

everity)







Medications





                                date            description     facility

 

                                2023 00:00 Polymyxin B Sulf-Trimethoprim [facility]







Problems





                                date            description     facility

 

                                2023 00:00 Corneal abrasion [facility]

l







Results/Labs





                    test      date      facility  value     unit      notes







                                        

 

                                        

 

                                        

 

                                        

 

                                        

 

                                        

 

                                        

 

                                        

 

                                        

 

                                        

 

                                        

 

                                        

 

                                        

 

                                        

 

                                        

 

                                        

 

                                        

 

                                        

 

                                        

 

                                        

 

                                        

 

                                        

 

                                        

 

                                        

 

                                        

 

                                        

 

                                        

 

                                        

 

                                        

 

                                        

 

                                        

 

                                        

 

                                        

 

                                        

 

                                        

 

                                        

 

                                        

 

                                        

 

                                        

 

                                        

 

                                        

 

                                        

 

                                        

 

                                        

 

                                        

 

                                        

 

                                        

 

                                        

 

                                        

 

                                        

 

                                        

 

                                        

 

                                        

 

                                        

 

                                        

 

                                        

 

                                        

 

                                        

 

                                        

 

                                        

 

                                        

 

                                        

 

                                        

 

                                        

 

                                        

 

                                        

 

                                        

 

                                        

 

                                        

 

                                        

 

                                        

 

                                        

 

                                        

 

                                        

 

                                        

 

                                        

 

                                        

 

                                        

 

                                        

 

                                        

 

                                        

 

                                        

 

                                        

 

                                        

 

                                        

 

                                        

 

                                        

 

                                        

 

                                        

 

                                        

 

                                        

 

                                        

 

                                        

 

                                        

 

                                        

 

                                        

 

                                        

 

                                        

 

                                        

 

                                        

 

                                        

 

                                        

 

                                        

 

                                        

 

                                        

 

                                        

 

                                        

 

                                        

 

                                        

 

                                        

 

                                        

 

                                        

 

                                        

 

                                        

 

                                        

 

                                        

 

                                        

 

                                        

 

                                        

 

                                        

 

                                        

 

                                        

 

                                        

 

                                        

 

                                        

 

                                        

 

                                        

 

                                        

 

                                        

 

                                        

 

                                        

 

                                        

 

                                        

 

                                        

 

                                        

 

                                        

 

                                        

 

                                        

 

                                        

 

                                        

 

                                        

 

                                        

 

                                        

 

                                        

 

                                        

 

                                        

 

                                        

 

                                        

 

                                        

 

                                        

 

                                        

 

                                        

 

                                        

 

                                        

 

                                        

 

                                        

 

                                        

 

                                        

 

                                        

 

                                        

 

                                        

 

                                        

 

                                        

 

                                        

 

                                        

 

                                        

 

                                        

 

                                        

 

                                        

 

                                        

 

                                        

 

                                        

 

                                        

 

                                        

 

                                        

 

                                        

 

                                        

 

                                        

 

                                        

 

                                        

 

                                        

 

                                        

 

                                        

 

                                        

 

                                        

 

                                        

 

                                        

 

                                        

 

                                        

 

                                        

 

                                        

 

                                        

 

                                        

 

                                        

 

                                        

 

                                        

 

                                        

 

                                        

 

                                        

 

                                        

 

                                        

 

                                        

 

                                        

 

                                        

 

                                        

 

                                        

 

                                        

 

                                        

 

                                        

 

                                        

 

                                        

 

                                        

 

                                        

 

                                        

 

                                        

 

                                        

 

                                        

 

                                        

 

                                        

 

                                        

 

                                        

 

                                        

 

                                        

 

                                        

 

                                        

 

                                        

 

                                        

 

                                        

 

                                        

 

                                        

 

                                        

 

                                        

 

                                        

 

                                        

 

                                        

 

                                        

 

                                        

 

                                        

 

                                        

 

                                        

 

                                        

 

                                        

 

                                        

 

                                        

 

                                        

 

                                        

 

                                        

 

                                        

 

                                        

 

                                        

 

                                        

 

                                        

 

                                        

 

                                        

 

                                        

 

                                        

 

                                        

 

                                        

 

                                        

 

                                        

 

                                        

 

                                        

 

                                        

 

                                        

 

                                        

 

                                        

 

                                        

 

                                        

 

                                        

 

                                        

 

                                        

 

                                        

 

                                        

 

                                        

 

                                        

 

                                        

 

                                        

 

                                        

 

                                        

 

                                        

 

                                        

 

                                        

 

                                        

 

                                        

 

                                        

 

                                        

 

                                        

 

                                        

 

                                        

 

                                        

 

                                        

 

                                        

 

                                        

 

                                        

 

                                        

 

                                        

 

                                        

 

                                        

 

                                        

 

                                        

 

                                        

 

                                        

 

                                        

 

                                        

 

                                        

 

                                        

 

                                        

 

                                        

 

                                        

 

                                        

 

                                        

 

                                        

 

                                        

 

                                        

 

                                        

 

                                        

 

                                        

 

                                        

 

                                        

 

                                        

 

                                        

 

                                        

 

                                        

 

                                        

 

                                        

 

                                        

 

                                        

 

                                        

 

                                        

 

                                        

 

                                        

 

                                        

 

                                        

 

                                        

 

                                        

 

                                        

 

                                        

 

                                        

 

                                        

 

                                        

 

                                        

 

                                        

 

                                        

 

                                        

 

                                        

 

                                        

 

                                        

 

                                        

 

                                        

 

                                        

 

                                        

 

                                        

 

                                        

 

                                        

 

                                        







Social History





                                date            description     facility

 

                                2023 00:00 Unknown if ever [facility]

ospital







Vital Signs





                          date         measurement  value        units

 

                          2023 00:00 BMI          40.3         kg/m2

 

                          2023 00:00 BP_diastolic 107          mmHg

 

                          2023 00:00 BP_systolic  142          mmHg

 

                          2023 00:00 heart_rate   81           /min

 

                          2023 00:00 height_metric 172.72       cm

 

                          2023 00:00 height_standard 68           in

 

                          2023 00:00 o2_saturation 98           %

 

                          2023 00:00 respiration_rate 16           /min

 

                          2023 00:00 temperature_metric 36.89        C

 

                          2023 00:00 temperature_standard 98.4         F

 

                          2023 00:00 weight_metric 120.2        kg

 

                          2023 00:00 weight_standard 265          lb

## 2023-07-23 ENCOUNTER — HOSPITAL ENCOUNTER (OUTPATIENT)
Dept: HOSPITAL 76 - EMS | Age: 43
Discharge: ANOTHER HOSPITAL | End: 2023-07-23
Payer: MEDICARE

## 2023-07-23 DIAGNOSIS — R19.7: Primary | ICD-10-CM

## 2023-07-23 DIAGNOSIS — R11.0: ICD-10-CM

## 2023-07-25 ENCOUNTER — HOSPITAL ENCOUNTER (OUTPATIENT)
Dept: HOSPITAL 76 - EMS | Age: 43
Discharge: ANOTHER HOSPITAL | End: 2023-07-25
Payer: MEDICARE

## 2023-07-25 ENCOUNTER — HOSPITAL ENCOUNTER (EMERGENCY)
Dept: HOSPITAL 76 - ED | Age: 43
Discharge: HOME | End: 2023-07-25
Payer: MEDICARE

## 2023-07-25 VITALS — SYSTOLIC BLOOD PRESSURE: 153 MMHG | DIASTOLIC BLOOD PRESSURE: 81 MMHG

## 2023-07-25 DIAGNOSIS — R11.2: Primary | ICD-10-CM

## 2023-07-25 DIAGNOSIS — R19.7: ICD-10-CM

## 2023-07-25 DIAGNOSIS — Z79.899: ICD-10-CM

## 2023-07-25 DIAGNOSIS — R11.15: Primary | ICD-10-CM

## 2023-07-25 PROCEDURE — 96374 THER/PROPH/DIAG INJ IV PUSH: CPT

## 2023-07-25 PROCEDURE — 36415 COLL VENOUS BLD VENIPUNCTURE: CPT

## 2023-07-25 PROCEDURE — 85025 COMPLETE CBC W/AUTO DIFF WBC: CPT

## 2023-07-25 PROCEDURE — 83690 ASSAY OF LIPASE: CPT

## 2023-07-25 PROCEDURE — 80053 COMPREHEN METABOLIC PANEL: CPT

## 2023-07-25 PROCEDURE — 99283 EMERGENCY DEPT VISIT LOW MDM: CPT

## 2023-07-25 RX ADMIN — HALOPERIDOL LACTATE STA: 5 INJECTION, SOLUTION INTRAMUSCULAR at 09:41

## 2023-07-25 RX ADMIN — DROPERIDOL STA MG: 2.5 INJECTION, SOLUTION INTRAMUSCULAR; INTRAVENOUS at 07:29

## 2023-07-25 RX ADMIN — SODIUM CHLORIDE STA MLS/HR: 9 INJECTION, SOLUTION INTRAVENOUS at 07:29

## 2023-07-25 NOTE — EXTERNAL MEDICAL SUMMARY RPT
Continuity of Care Document



                            Created on: 2023





FARIBA RAHMAN

External Reference #: [id number]

: 1980

Sex: Female



Demographics





                                        Address             [address] [place]  [address]

 

                                        Phone               Unavailable

 

                                        Preferred Language  English

 

                                        Marital Status      Never 

 

                                        Yarsani Affiliation Unknown

 

                                        Race                Unknown

 

                                        Ethnic Group        Unknown





Author





                                        Name                Unknown

 

                                        Address              [place]  [address]

 

                                        Phone               [phone]

 

                                        Organization        [employer]

 

                                        [place]  [address]

 

                                        Phone               [phone]





Care Team Providers





                                Care Team Member Name Role            Phone

 

                                Nikolai Simms       Unavailable     Unavailable







Allergies and Intolerances





                      date       description facility   reaction   severity

 

                      (no date)  [facility] (no reaction) (no s

everity)







Medications





                                date            description     facility

 

                                2023 00:00 Polymyxin B Sulf-Trimethoprim [facility]







Problems





                                date            description     facility

 

                                2023 00:00 Corneal abrasion [facility]

l







Results/Labs





                    test      date      facility  value     unit      notes







                                        

 

                                        

 

                                        

 

                                        

 

                                        

 

                                        

 

                                        

 

                                        

 

                                        

 

                                        

 

                                        

 

                                        

 

                                        

 

                                        

 

                                        

 

                                        

 

                                        

 

                                        

 

                                        

 

                                        

 

                                        

 

                                        

 

                                        

 

                                        

 

                                        

 

                                        

 

                                        

 

                                        

 

                                        

 

                                        

 

                                        

 

                                        

 

                                        

 

                                        

 

                                        

 

                                        

 

                                        

 

                                        

 

                                        

 

                                        

 

                                        

 

                                        

 

                                        

 

                                        

 

                                        

 

                                        

 

                                        

 

                                        

 

                                        

 

                                        

 

                                        

 

                                        

 

                                        

 

                                        

 

                                        

 

                                        

 

                                        

 

                                        

 

                                        

 

                                        

 

                                        

 

                                        

 

                                        

 

                                        

 

                                        

 

                                        

 

                                        

 

                                        

 

                                        

 

                                        

 

                                        

 

                                        

 

                                        

 

                                        

 

                                        

 

                                        

 

                                        

 

                                        

 

                                        

 

                                        

 

                                        

 

                                        

 

                                        

 

                                        

 

                                        

 

                                        

 

                                        

 

                                        

 

                                        

 

                                        

 

                                        

 

                                        

 

                                        

 

                                        

 

                                        

 

                                        

 

                                        

 

                                        

 

                                        

 

                                        

 

                                        

 

                                        

 

                                        

 

                                        

 

                                        

 

                                        

 

                                        

 

                                        

 

                                        

 

                                        

 

                                        

 

                                        

 

                                        

 

                                        

 

                                        

 

                                        

 

                                        

 

                                        

 

                                        

 

                                        

 

                                        

 

                                        

 

                                        

 

                                        

 

                                        

 

                                        

 

                                        

 

                                        

 

                                        

 

                                        

 

                                        

 

                                        

 

                                        

 

                                        

 

                                        

 

                                        

 

                                        

 

                                        

 

                                        

 

                                        

 

                                        

 

                                        

 

                                        

 

                                        

 

                                        

 

                                        

 

                                        

 

                                        

 

                                        

 

                                        

 

                                        

 

                                        

 

                                        

 

                                        

 

                                        

 

                                        

 

                                        

 

                                        

 

                                        

 

                                        

 

                                        

 

                                        

 

                                        

 

                                        

 

                                        

 

                                        

 

                                        

 

                                        

 

                                        

 

                                        

 

                                        

 

                                        

 

                                        

 

                                        

 

                                        

 

                                        

 

                                        

 

                                        

 

                                        

 

                                        

 

                                        

 

                                        

 

                                        

 

                                        

 

                                        

 

                                        

 

                                        

 

                                        

 

                                        

 

                                        

 

                                        

 

                                        

 

                                        

 

                                        

 

                                        

 

                                        

 

                                        

 

                                        

 

                                        

 

                                        

 

                                        

 

                                        

 

                                        

 

                                        

 

                                        

 

                                        

 

                                        

 

                                        

 

                                        

 

                                        

 

                                        

 

                                        

 

                                        

 

                                        

 

                                        

 

                                        

 

                                        

 

                                        

 

                                        

 

                                        

 

                                        

 

                                        

 

                                        

 

                                        

 

                                        

 

                                        

 

                                        

 

                                        

 

                                        

 

                                        

 

                                        

 

                                        

 

                                        

 

                                        

 

                                        

 

                                        

 

                                        

 

                                        

 

                                        

 

                                        

 

                                        

 

                                        

 

                                        

 

                                        

 

                                        

 

                                        

 

                                        

 

                                        

 

                                        

 

                                        

 

                                        

 

                                        

 

                                        

 

                                        

 

                                        

 

                                        

 

                                        

 

                                        

 

                                        

 

                                        

 

                                        

 

                                        

 

                                        

 

                                        

 

                                        

 

                                        

 

                                        

 

                                        

 

                                        

 

                                        

 

                                        

 

                                        

 

                                        

 

                                        

 

                                        

 

                                        

 

                                        

 

                                        

 

                                        

 

                                        

 

                                        

 

                                        

 

                                        

 

                                        

 

                                        

 

                                        

 

                                        

 

                                        

 

                                        

 

                                        

 

                                        

 

                                        

 

                                        

 

                                        

 

                                        

 

                                        

 

                                        

 

                                        

 

                                        

 

                                        

 

                                        

 

                                        

 

                                        

 

                                        

 

                                        

 

                                        

 

                                        

 

                                        

 

                                        

 

                                        

 

                                        

 

                                        

 

                                        

 

                                        

 

                                        

 

                                        

 

                                        

 

                                        

 

                                        

 

                                        

 

                                        

 

                                        

 

                                        

 

                                        

 

                                        

 

                                        

 

                                        

 

                                        

 

                                        

 

                                        

 

                                        

 

                                        

 

                                        

 

                                        

 

                                        

 

                                        

 

                                        

 

                                        

 

                                        

 

                                        

 

                                        

 

                                        

 

                                        

 

                                        

 

                                        

 

                                        

 

                                        

 

                                        

 

                                        

 

                                        

 

                                        

 

                                        

 

                                        

 

                                        

 

                                        







Social History





                                date            description     facility

 

                                2023 00:00 Unknown if ever smoked [facility]

ospital







Vital Signs





                          date         measurement  value        units

 

                          2023 00:00 BMI          40.3         kg/m2

 

                          2023 00:00 BP_diastolic 107          mmHg

 

                          2023 00:00 BP_systolic  142          mmHg

 

                          2023 00:00 heart_rate   81           /min

 

                          2023 00:00 height_metric 172.72       cm

 

                          2023 00:00 height_standard 68           in

 

                          2023 00:00 o2_saturation 98           %

 

                          2023 00:00 respiration_rate 16           /min

 

                          2023 00:00 temperature_metric 36.89        C

 

                          2023 00:00 temperature_standard 98.4         F

 

                          2023 00:00 weight_metric 120.2        kg

 

                          2023 00:00 weight_standard 265          lb

## 2023-07-25 NOTE — ED PHYSICIAN DOCUMENTATION
PD HPI NVD





- Stated complaint


Stated Complaint: VOMITING





- Chief complaint


Chief Complaint: Abd Pain





- History obtained from


History obtained from: Patient, EMS





- Additonal information


Additional information: 





42-year-old female very well-known to this emergency department for numerous 

visits for nausea vomiting presents by EMS from home for nausea and vomiting.  

Patient has cyclic vomiting syndrome, she continues to smoke marijuana.  Stated 

that her rectal Phenergan was not helping her.





Review of Systems


Constitutional: denies: Fever, Chills


GI: reports: Nausea, Vomiting, Diarrhea.  denies: Abdominal Pain


Musculoskeletal: denies: Neck pain, Back pain, Extremity pain





PD PAST MEDICAL HISTORY





- Past Medical History


Past Medical History: Yes


Cardiovascular: Arrhythmia


Respiratory: Asthma


Neuro: Head injury, Migraines


Endocrine/Autoimmune: None


GI: Chronic diarrhea, Other


GYN: Ovarian cysts


: Other


HEENT: None


Psych: None


Musculoskeletal: Osteoarthritis, Fibromyalgia


Derm: Psoriasis





- Past Surgical History


Past Surgical History: Yes


General: Appendectomy


Ortho: Arthroscopic surgery


HEENT: Other





- Present Medications


Home Medications: 


                                Ambulatory Orders











 Medication  Instructions  Recorded  Confirmed


 


Albuterol 2.5 mg INH Q4H PRN 09/08/13 07/25/23


 


Cetirizine [ZyrTEC] 10 mg PO DAILY 09/08/13 01/13/23


 


Diphenoxylate HCl/Atropine 1 each PO Q4HR PRN 09/08/13 07/25/23





[Lomotil Tablet]   


 


Ondansetron Oral Soln [Zofran] 4 mg PO Q6HR PRN 09/08/13 07/25/23


 


atenoloL [Tenormin] 50 mg PO DAILY 09/08/13 07/25/23


 


Oxycodone HCl/Acetaminophen 1 - 2 each PO Q6H PRN #10 tablet 06/06/14 07/25/23





[Percocet 5-325 mg Tablet]   


 


Gabapentin [Neurontin] 600 mg PO TID 01/13/23 07/25/23


 


LORazepam [Ativan] 0.5 mg PO PRN 01/13/23 


 


methocarbamoL [Methocarbamol] 750 mg PO TID 01/13/23 07/25/23


 


Promethazine [Phenergan] 25 mg PO Q6H PRN #10 tab 07/11/23 07/25/23


 


Amitriptyline [Elavil] 25 mg PO HS #30 tablet 07/21/23 


 


Promethazine Supp [Phenergan Supp] 25 mg ND Q6H PRN #20 supp 07/23/23 07/25/23














- Allergies


Allergies/Adverse Reactions: 


                                    Allergies











Allergy/AdvReac Type Severity Reaction Status Date / Time


 


Penicillins Allergy Intermediate Hives Verified 07/23/23 06:26


 


codeine [Codeine] Allergy  Hives Verified 07/23/23 06:26














- Social History


Does the pt smoke?: No


Smoking Status: Never smoker


Does the pt drink ETOH?: No


Does the pt have substance abuse?: Yes


Substance Use and Type: Marijuana





- Immunizations


Immunizations are current?: Yes





- POLST


Patient has POLST: No





PD ED PE NORMAL





- General


General: Alert and oriented X 3, No acute distress, Well developed/nourished





- Neck


Neck: Supple, no meningeal sign





- Cardiac


Cardiac: No murmur, Strong equal pulses





- Respiratory


Respiratory: No respiratory distress, Clear bilaterally





- Abdomen


Abdomen: Soft, Non tender, Non distended





- Derm


Derm: Normal color, Warm and dry, No rash





- Extremities


Extremities: No deformity, No tenderness to palpate, Normal ROM s pain





- Neuro


Neuro: Alert and oriented X 3, CNs 2-12 intact, No motor deficit, No sensory 

deficit, Normal speech





Results





- Vitals


Vitals: 


                               Vital Signs - 24 hr











  07/25/23 07/25/23 07/25/23





  06:56 07:51 09:44


 


Temperature 36.1 C L  36.8 C


 


Heart Rate 87 80 98


 


Respiratory 16 12 20





Rate   


 


Blood Pressure 151/76 H 159/76 H 153/81 H


 


O2 Saturation 100 94 94








                                     Oxygen











O2 Source                      Room air

















PD Medical Decision Making





- ED course


Complexity details: reviewed results, re-evaluated patient, considered 

differential, d/w patient, d/w family


ED course: 





Nausea, history of cyclic vomiting syndrome.  Abdomen soft.  Patient here 2 days

 ago with negative work-up.  Patient given droperidol, tolerating p.o.  Stated 

that she was ready to go home.  Will call primary care physician to expedite 

referral to GI.





Departure





- Departure


Disposition: 01 Home, Self Care


Clinical Impression: 


 Nausea and vomiting





Condition: Stable


Instructions:  ED Nausea Vomiting


Forms:  PCP List


Discharge Date/Time: 07/25/23 09:49

## 2023-10-05 ENCOUNTER — HOSPITAL ENCOUNTER (OUTPATIENT)
Dept: HOSPITAL 76 - DI | Age: 43
Discharge: HOME | End: 2023-10-05
Attending: STUDENT IN AN ORGANIZED HEALTH CARE EDUCATION/TRAINING PROGRAM
Payer: MEDICARE

## 2023-10-05 DIAGNOSIS — R06.00: Primary | ICD-10-CM

## 2023-10-05 NOTE — XRAY REPORT
PROCEDURE:  Chest 2 View X-Ray

 

INDICATIONS:  DYSPNEA

 

TECHNIQUE:  2 views of the chest were acquired.  

 

COMPARISON:  None.

 

FINDINGS:  

 

Surgical changes and devices:  None.  

 

Lungs and pleura:  No pleural effusions or pneumothorax.  Lungs are clear.  

 

Mediastinum:  Mediastinal contours appear normal.  Heart size is normal.  

 

Bones and chest wall:  No suspicious bony lesions.  Overlying soft tissues appear unremarkable.  

 

 

IMPRESSION:  

 

No acute cardiopulmonary process.

 

 

 

Reviewed by: Guillermina Alvarez MD on 10/5/2023 1:31 PM PDT

Approved by: Guillermina Alvarez MD on 10/5/2023 1:31 PM PDT

 

 

Station ID:  [id number]

## 2023-12-08 ENCOUNTER — HOSPITAL ENCOUNTER (OUTPATIENT)
Dept: HOSPITAL 76 - EMS | Age: 43
Discharge: ANOTHER HOSPITAL | End: 2023-12-08
Payer: MEDICARE

## 2023-12-08 ENCOUNTER — HOSPITAL ENCOUNTER (EMERGENCY)
Dept: HOSPITAL 76 - ED | Age: 43
LOS: 1 days | Discharge: HOME | End: 2023-12-09
Payer: MEDICARE

## 2023-12-08 ENCOUNTER — HOSPITAL ENCOUNTER (OUTPATIENT)
Dept: HOSPITAL 76 - EMS | Age: 43
End: 2023-12-08
Payer: MEDICARE

## 2023-12-08 DIAGNOSIS — Z79.899: ICD-10-CM

## 2023-12-08 DIAGNOSIS — R11.0: Primary | ICD-10-CM

## 2023-12-08 DIAGNOSIS — R10.816: ICD-10-CM

## 2023-12-08 DIAGNOSIS — R11.2: Primary | ICD-10-CM

## 2023-12-08 PROCEDURE — 80053 COMPREHEN METABOLIC PANEL: CPT

## 2023-12-08 PROCEDURE — 96376 TX/PRO/DX INJ SAME DRUG ADON: CPT

## 2023-12-08 PROCEDURE — 36415 COLL VENOUS BLD VENIPUNCTURE: CPT

## 2023-12-08 PROCEDURE — 85025 COMPLETE CBC W/AUTO DIFF WBC: CPT

## 2023-12-08 PROCEDURE — 96374 THER/PROPH/DIAG INJ IV PUSH: CPT

## 2023-12-08 PROCEDURE — 81025 URINE PREGNANCY TEST: CPT

## 2023-12-08 PROCEDURE — 99283 EMERGENCY DEPT VISIT LOW MDM: CPT

## 2023-12-08 PROCEDURE — 93005 ELECTROCARDIOGRAM TRACING: CPT

## 2023-12-09 VITALS — DIASTOLIC BLOOD PRESSURE: 77 MMHG | OXYGEN SATURATION: 94 % | SYSTOLIC BLOOD PRESSURE: 156 MMHG

## 2023-12-09 LAB
ALBUMIN DIAFP-MCNC: 4.9 G/DL (ref 3.2–5.5)
ALBUMIN/GLOB SERPL: 1.8 {RATIO} (ref 1–2.2)
ALP SERPL-CCNC: 64 IU/L (ref 42–121)
ALT SERPL W P-5'-P-CCNC: 14 IU/L (ref 10–60)
ANION GAP SERPL CALCULATED.4IONS-SCNC: 12 MMOL/L (ref 6–13)
AST SERPL W P-5'-P-CCNC: 13 IU/L (ref 10–42)
BASOPHILS NFR BLD AUTO: 0 10^3/UL (ref 0–0.1)
BASOPHILS NFR BLD AUTO: 0.2 %
BILIRUB BLD-MCNC: 0.5 MG/DL (ref 0.2–1)
BUN SERPL-MCNC: 10 MG/DL (ref 6–20)
CALCIUM UR-MCNC: 10.1 MG/DL (ref 8.5–10.3)
CHLORIDE SERPL-SCNC: 102 MMOL/L (ref 101–111)
CO2 SERPL-SCNC: 22 MMOL/L (ref 21–32)
CREAT SERPLBLD-SCNC: 0.7 MG/DL (ref 0.6–1.3)
EOSINOPHIL # BLD AUTO: 0 10^3/UL (ref 0–0.7)
EOSINOPHIL NFR BLD AUTO: 0.1 %
ERYTHROCYTE [DISTWIDTH] IN BLOOD BY AUTOMATED COUNT: 11.7 % (ref 12–15)
GFRSERPLBLD MDRD-ARVRAT: 90 ML/MIN/{1.73_M2} (ref 89–?)
GLOBULIN SER-MCNC: 2.8 G/DL (ref 2.1–4.2)
GLUCOSE SERPL-MCNC: 125 MG/DL (ref 74–104)
HCG UR QL: NEGATIVE
HCT VFR BLD AUTO: 40.7 % (ref 37–47)
HGB UR QL STRIP: 14 G/DL (ref 12–16)
LYMPHOCYTES # SPEC AUTO: 1.3 10^3/UL (ref 1.5–3.5)
LYMPHOCYTES NFR BLD AUTO: 11 %
MCH RBC QN AUTO: 29.2 PG (ref 27–31)
MCHC RBC AUTO-ENTMCNC: 34.4 G/DL (ref 32–36)
MCV RBC AUTO: 85 FL (ref 81–99)
MONOCYTES # BLD AUTO: 0.4 10^3/UL (ref 0–1)
MONOCYTES NFR BLD AUTO: 3.6 %
NEUTROPHILS # BLD AUTO: 9.8 10^3/UL (ref 1.5–6.6)
NEUTROPHILS # SNV AUTO: 11.6 X10^3/UL (ref 4.8–10.8)
NEUTROPHILS NFR BLD AUTO: 84.8 %
NRBC # BLD AUTO: 0 /100WBC
NRBC # BLD AUTO: 0 X10^3/UL
PDW BLD AUTO: 8.6 FL (ref 7.9–10.8)
PLATELET # BLD: 265 10^3/UL (ref 130–450)
POTASSIUM SERPL-SCNC: 3.4 MMOL/L (ref 3.5–4.5)
PROT SPEC-MCNC: 7.7 G/DL (ref 6.4–8.9)
RBC MAR: 4.79 10^6/UL (ref 4.2–5.4)
SODIUM SERPLBLD-SCNC: 136 MMOL/L (ref 135–145)

## 2023-12-09 NOTE — ED PHYSICIAN DOCUMENTATION
PD HPI NVD





- Stated complaint


Stated Complaint: N/V





- Chief complaint


Chief Complaint: Abd Pain





- History obtained from


History obtained from: Patient, EMS





- Additonal information


Additional information: 





43-year-old female with history of cannabinoid hyperemesis presents by EMS from 

home for nausea and vomiting.  Patient states that she easily gets dehydrated 

and her home nausea medication is no longer working.  Denies abdominal pain.





Review of Systems


Constitutional: denies: Fever, Chills


GI: reports: Nausea, Vomiting.  denies: Abdominal Pain, Constipation, Diarrhea


Musculoskeletal: denies: Neck pain, Back pain, Extremity pain


Neurologic: denies: Generalized weakness, Focal weakness, Numbness





PD PAST MEDICAL HISTORY





- Past Medical History


Past Medical History: Yes


Cardiovascular: Arrhythmia


Respiratory: Asthma


Neuro: Head injury, Migraines


Endocrine/Autoimmune: None


GI: Chronic diarrhea, Other


GYN: Ovarian cysts


: Other


HEENT: None


Psych: None


Musculoskeletal: Osteoarthritis, Fibromyalgia


Derm: Psoriasis





- Past Surgical History


Past Surgical History: Yes


General: Appendectomy


Ortho: Arthroscopic surgery


HEENT: Other





- Present Medications


Home Medications: 


                                Ambulatory Orders











 Medication  Instructions  Recorded  Confirmed


 


Albuterol 2.5 mg INH Q4H PRN 09/08/13 07/25/23


 


Cetirizine [ZyrTEC] 10 mg PO DAILY 09/08/13 01/13/23


 


Diphenoxylate HCl/Atropine 1 each PO Q4HR PRN 09/08/13 07/25/23





[Lomotil Tablet]   


 


Ondansetron Oral Soln [Zofran] 4 mg PO Q6HR PRN 09/08/13 12/08/23


 


atenoloL [Tenormin] 50 mg PO DAILY 09/08/13 07/25/23


 


Oxycodone HCl/Acetaminophen 1 - 2 each PO Q6H PRN #10 tablet 06/06/14 12/08/23





[Percocet 5-325 mg Tablet]   


 


Gabapentin [Neurontin] 600 mg PO TID 01/13/23 07/25/23


 


LORazepam [Ativan] 0.5 mg PO PRN 01/13/23 


 


methocarbamoL [Methocarbamol] 750 mg PO TID 01/13/23 07/25/23


 


Promethazine [Phenergan] 25 mg PO Q6H PRN #10 tab 07/11/23 12/08/23


 


Amitriptyline [Elavil] 25 mg PO HS #30 tablet 07/21/23 


 


Promethazine Supp [Phenergan Supp] 25 mg SC Q6H PRN #20 supp 07/23/23 12/08/23


 


Ondansetron Odt [Zofran] 4 mg TL Q6H PRN #30 tablet 12/09/23 


 


Promethazine [Phenergan] 25 mg PO Q6H PRN #30 tab 12/09/23 














- Allergies


Allergies/Adverse Reactions: 


                                    Allergies











Allergy/AdvReac Type Severity Reaction Status Date / Time


 


Penicillins Allergy Intermediate Hives Verified 12/08/23 23:30


 


codeine [Codeine] Allergy  Hives Verified 12/08/23 23:30














- Social History


Does the pt smoke?: No


Smoking Status: Never smoker


Does the pt drink ETOH?: No


Does the pt have substance abuse?: Yes





- Immunizations


Immunizations are current?: Yes





- POLST


Patient has POLST: No





PD ED PE NORMAL





- Vitals


Vital signs reviewed: Yes





- General


General: Alert and oriented X 3, No acute distress, Well developed/nourished





- HEENT


HEENT: Atraumatic, PERRL





- Neck


Neck: Supple, no meningeal sign





- Cardiac


Cardiac: RRR, Strong equal pulses





- Respiratory


Respiratory: No respiratory distress, Clear bilaterally





- Abdomen


Abdomen: Soft, Non tender





- Derm


Derm: Normal color, Warm and dry, No rash





- Extremities


Extremities: No deformity, No tenderness to palpate, Normal ROM s pain





- Neuro


Neuro: Alert and oriented X 3, CNs 2-12 intact, No motor deficit, Normal speech





Results





- Vitals


Vitals: 


                                     Oxygen











O2 Source                      Room air

















- Labs


Labs: 


                                Laboratory Tests











  12/08/23 12/08/23 12/09/23





  23:50 23:50 00:00


 


WBC  11.6 H  


 


RBC  4.79  


 


Hgb  14.0  


 


Hct  40.7  


 


MCV  85.0  


 


MCH  29.2  


 


MCHC  34.4  


 


RDW  11.7 L  


 


Plt Count  265  


 


MPV  8.6  


 


Neut # (Auto)  9.8 H  


 


Lymph # (Auto)  1.3 L  


 


Mono # (Auto)  0.4  


 


Eos # (Auto)  0.0  


 


Baso # (Auto)  0.0  


 


Absolute Nucleated RBC  0.00  


 


Nucleated RBC %  0.0  


 


Sodium   136 


 


Potassium   3.4 L 


 


Chloride   102 


 


Carbon Dioxide   22 


 


Anion Gap   12.0 


 


BUN   10 


 


Creatinine   0.7 


 


Estimated GFR (MDRD)   90 


 


Glucose   125 H 


 


Calcium   10.1 


 


Total Bilirubin   0.5 


 


AST   13 


 


ALT   14 


 


Alkaline Phosphatase   64 


 


Total Protein   7.7 


 


Albumin   4.9 


 


Globulin   2.8 


 


Albumin/Globulin Ratio   1.8 


 


Urine HCG, Qual    NEGATIVE














PD Medical Decision Making





- ED course


Complexity details: reviewed old records, reviewed results, re-evaluated 

patient, considered differential, d/w patient


ED course: 





Well-appearing patient with nausea and vomiting similar to previous 

presentations.  Abdomen soft, hemodynamically stable.  She was given several 

doses of antiemetics and IV fluids.  Laboratory work is reviewed, electrolytes 

are within normal limits.  She states that she was going to see GI but her 

appointments keep getting canceled and she is talking with her primary care 

physician about referral to a different GI doctor.  She requested a refill of 

some of her nausea medications, which were sent to pharmacy of choice.





Departure





- Departure


Disposition: 01 Home, Self Care


Clinical Impression: 


Nausea & vomiting


Qualifiers:


 Vomiting type: unspecified Qualified Code(s): R11.2 - Nausea with vomiting, 

unspecified





Condition: Stable


Instructions:  ED Vomiting Diarrhea Nonspecific Ad


Prescriptions: 


Promethazine [Phenergan] 25 mg PO Q6H PRN #30 tab


 PRN Reason: Nausea / Vomiting


Ondansetron Odt [Zofran] 4 mg TL Q6H PRN #30 tablet


 PRN Reason: Nausea / Vomiting


Forms:  PCP List


Discharge Date/Time: 12/09/23 03:35

## 2023-12-28 ENCOUNTER — HOSPITAL ENCOUNTER (OUTPATIENT)
Dept: HOSPITAL 76 - EMS | Age: 43
Discharge: ANOTHER HOSPITAL | End: 2023-12-28
Payer: MEDICARE

## 2023-12-28 ENCOUNTER — HOSPITAL ENCOUNTER (EMERGENCY)
Dept: HOSPITAL 76 - ED | Age: 43
LOS: 1 days | Discharge: HOME | End: 2023-12-29
Payer: MEDICARE

## 2023-12-28 DIAGNOSIS — R11.0: Primary | ICD-10-CM

## 2023-12-28 DIAGNOSIS — R53.1: ICD-10-CM

## 2023-12-28 DIAGNOSIS — R10.84: ICD-10-CM

## 2023-12-28 DIAGNOSIS — R11.14: Primary | ICD-10-CM

## 2023-12-28 PROCEDURE — 96375 TX/PRO/DX INJ NEW DRUG ADDON: CPT

## 2023-12-28 PROCEDURE — 99283 EMERGENCY DEPT VISIT LOW MDM: CPT

## 2023-12-28 PROCEDURE — 96374 THER/PROPH/DIAG INJ IV PUSH: CPT

## 2023-12-28 NOTE — ED PHYSICIAN DOCUMENTATION
History of Present Illness





- Stated complaint


Stated Complaint: NAUSEA X 2 DAYS





- Chief complaint


Chief Complaint: Abd Pain





- History obtained from


History obtained from: Patient, EMS





- Additonal information


Additional information: 


The patient comes to the emergency department chief complaint of nausea, 

vomiting, and crampy abdominal pain that has been going on since yesterday.  She

has a history of recurrent bouts of abdominal pain, nausea, and vomiting, which 

has previously been attributed to her cannabis use.  The patient states that her

symptoms today are similar to previous episodes.  No fevers or chills.  No pain 

other than the cramping.  She states that she does have Zofran and Phenergan at 

home but has run out of Zofran.  She states that the Zofran she was given en 

route helped for a while but now her nausea is coming back.  No other complaints

at this time.








PD PAST MEDICAL HISTORY





- Past Medical History


Cardiovascular: Arrhythmia


Respiratory: Asthma


Neuro: Head injury, Migraines


Endocrine/Autoimmune: None


GI: Chronic diarrhea, Other


GYN: Ovarian cysts


: Other


HEENT: None


Psych: None


Musculoskeletal: Osteoarthritis, Fibromyalgia


Derm: Psoriasis





- Past Surgical History


Past Surgical History: Yes


General: Appendectomy


Ortho: Arthroscopic surgery


HEENT: Other





- Present Medications


Home Medications: 


                                Ambulatory Orders











 Medication  Instructions  Recorded  Confirmed


 


Albuterol 2.5 mg INH Q4H PRN 09/08/13 12/28/23


 


Cetirizine [ZyrTEC] 10 mg PO DAILY 09/08/13 12/28/23


 


Diphenoxylate HCl/Atropine 1 each PO Q4HR PRN 09/08/13 12/28/23





[Lomotil Tablet]   


 


Ondansetron Oral Soln [Zofran] 4 mg PO Q6HR PRN 09/08/13 12/28/23


 


atenoloL [Tenormin] 50 mg PO DAILY 09/08/13 12/28/23


 


Oxycodone HCl/Acetaminophen 1 - 2 each PO Q6H PRN #10 tablet 06/06/14 12/28/23





[Percocet 5-325 mg Tablet]   


 


Gabapentin [Neurontin] 600 mg PO TID 01/13/23 12/28/23


 


LORazepam [Ativan] 0.5 mg PO PRN PRN 01/13/23 12/28/23


 


methocarbamoL [Methocarbamol] 750 mg PO TID 01/13/23 12/28/23


 


Promethazine [Phenergan] 25 mg PO Q6H PRN #10 tab 07/11/23 12/28/23


 


Amitriptyline [Elavil] 25 mg PO HS #30 tablet 07/21/23 


 


Promethazine Supp [Phenergan Supp] 25 mg HI Q6H PRN #20 supp 07/23/23 12/28/23


 


Ondansetron Odt [Zofran] 4 mg TL Q6H PRN #30 tablet 12/09/23 12/28/23


 


Promethazine [Phenergan] 25 mg PO Q6H PRN #30 tab 12/09/23 12/28/23


 


Ondansetron Odt [Zofran] 4 mg TL Q6H PRN #20 tablet 12/28/23 














- Allergies


Allergies/Adverse Reactions: 


                                    Allergies











Allergy/AdvReac Type Severity Reaction Status Date / Time


 


Penicillins Allergy Intermediate Hives Verified 12/28/23 21:34


 


codeine [Codeine] Allergy  Hives Verified 12/28/23 21:34














- Social History


Does the pt smoke?: No


Smoking Status: Never smoker


Does the pt drink ETOH?: No


Does the pt have substance abuse?: Yes





- Immunizations


Immunizations are current?: Yes





- POLST


Patient has POLST: No





PD ED PE NORMAL





- Vitals


Vital signs reviewed: Yes





- General


General: Alert and oriented X 3, No acute distress, Well developed/nourished, 

Other (The patient sits upright but keeps her eyes closed during the entire 

interview and speaks with slurring of words. She is appropriate, however, 

otherwise and readily answers questions.  She appears uncomfortable.)





- HEENT


HEENT: Atraumatic, PERRL, EOMI, Moist mucous membranes





- Neck


Neck: Supple, no meningeal sign





- Cardiac


Cardiac: RRR, No murmur





- Respiratory


Respiratory: No respiratory distress, Clear bilaterally





- Abdomen


Abdomen: Soft, Non distended, Other (Mild diffuse tenderness, no rebound or 

guarding.)





- Derm


Derm: Normal color, Warm and dry, No rash





- Extremities


Extremities: No deformity





- Neuro


Neuro: Alert and oriented X 3, CNs 2-12 intact, Normal speech





- Psych


Psych: Normal mood, Normal affect





Results





- Vitals


Vitals: 


                               Vital Signs - 24 hr











  12/28/23 12/28/23 12/29/23





  21:27 23:32 00:35


 


Temperature 36.5 C  


 


Heart Rate 77 75 82


 


Respiratory 16  16





Rate   


 


Blood Pressure 145/86 H  135/76 H


 


O2 Saturation 100 94 94








                                     Oxygen











O2 Source                      Room air

















PD Medical Decision Making





- ED course


Complexity details: reviewed old records, reviewed results, re-evaluated 

patient, considered differential, d/w patient, d/w family


ED course: 


The patient is records were reviewed and she was treated symptomatically here in

 the emergency department with IV fluids and droperidol initially.  The patient 

was found to be feeling better but still had a little nausea and requested 1 

more dose of Zofran.  I did order this for her and also sent in a prescription 

to refill her home Zofran.  The patient reported feeling better and I felt she 

was stable for discharge.  We have discussed the usual indications for follow-up

 and return.








Departure





- Departure


Disposition: 01 Home, Self Care


Clinical Impression: 


Vomiting


Qualifiers:


 Vomiting type: bilious vomiting Nausea presence: with nausea Qualified Code(s):

 R11.14 - Bilious vomiting





Abdominal pain


Qualifiers:


 Abdominal location: generalized Qualified Code(s): R10.84 - Generalized 

abdominal pain





Condition: Stable


Instructions:  ED Nausea Vomiting


Prescriptions: 


Ondansetron Odt [Zofran] 4 mg TL Q6H PRN #20 tablet


 PRN Reason: Nausea / Vomiting


Comments: 


You been treated for cyclical vomiting in the emergency department tonight.  A 

prescription for your Zofran has been electronically transmitted to the [facility] in [place], your pharmacy of choice on record.  Please 

pick this up tomorrow.


Forms:  PCP List


Discharge Date/Time: 12/29/23 00:37

## 2023-12-29 VITALS — SYSTOLIC BLOOD PRESSURE: 135 MMHG | OXYGEN SATURATION: 94 % | DIASTOLIC BLOOD PRESSURE: 76 MMHG

## 2024-02-25 ENCOUNTER — HOSPITAL ENCOUNTER (EMERGENCY)
Dept: HOSPITAL 76 - ED | Age: 44
Discharge: HOME | End: 2024-02-25
Payer: MEDICARE

## 2024-02-25 ENCOUNTER — HOSPITAL ENCOUNTER (OUTPATIENT)
Dept: HOSPITAL 76 - EMS | Age: 44
Discharge: ANOTHER HOSPITAL | End: 2024-02-25
Payer: MEDICARE

## 2024-02-25 VITALS — OXYGEN SATURATION: 98 % | SYSTOLIC BLOOD PRESSURE: 145 MMHG | DIASTOLIC BLOOD PRESSURE: 82 MMHG

## 2024-02-25 DIAGNOSIS — I49.9: ICD-10-CM

## 2024-02-25 DIAGNOSIS — R11.2: Primary | ICD-10-CM

## 2024-02-25 DIAGNOSIS — Z79.899: ICD-10-CM

## 2024-02-25 DIAGNOSIS — R11.15: Primary | ICD-10-CM

## 2024-02-25 DIAGNOSIS — M19.90: ICD-10-CM

## 2024-02-25 DIAGNOSIS — J45.909: ICD-10-CM

## 2024-02-25 DIAGNOSIS — M79.7: ICD-10-CM

## 2024-02-25 DIAGNOSIS — R42: ICD-10-CM

## 2024-02-25 DIAGNOSIS — R53.1: ICD-10-CM

## 2024-02-25 PROCEDURE — 96374 THER/PROPH/DIAG INJ IV PUSH: CPT

## 2024-02-25 PROCEDURE — 99284 EMERGENCY DEPT VISIT MOD MDM: CPT

## 2024-02-25 RX ADMIN — DROPERIDOL STA MG: 2.5 INJECTION, SOLUTION INTRAMUSCULAR; INTRAVENOUS at 12:15

## 2024-02-25 RX ADMIN — ONDANSETRON STA MG: 2 INJECTION INTRAMUSCULAR; INTRAVENOUS at 12:15

## 2024-02-25 RX ADMIN — SODIUM CHLORIDE STA MLS/HR: 9 INJECTION, SOLUTION INTRAVENOUS at 12:19

## 2024-02-25 NOTE — ED PHYSICIAN DOCUMENTATION
History of Present Illness





- Stated complaint


Stated Complaint: N/V





- History obtained from


History obtained from: Patient





- Additonal information


Additional information: 





43-year-old woman with history of cyclic vomiting syndrome presents by ambulance

for an apparent exacerbation of same.  She has been sick for 2 to 3 days with 

vomiting for 2.  She has attacks about monthly and they are associated with her 

menses.  Has been on birth control in the past which made her worse.  Also was 

intolerant of amitriptyline and it sounds like in the past.  She does use 

cannabis, but says she quit cannabis for 3 months and was worse during that ti

me.  Declines pain medication stating "I have pain medication in me."





PD PAST MEDICAL HISTORY





- Past Medical History


Cardiovascular: Arrhythmia


Respiratory: Asthma


Neuro: Head injury, Migraines


Endocrine/Autoimmune: None


GI: Chronic diarrhea, Other


GYN: Ovarian cysts


: Other


HEENT: None


Psych: None


Musculoskeletal: Osteoarthritis, Fibromyalgia


Derm: Psoriasis





- Past Surgical History


Past Surgical History: Yes


General: Appendectomy


Ortho: Arthroscopic surgery


HEENT: Other





- Present Medications


Home Medications: 


                                Ambulatory Orders











 Medication  Instructions  Recorded  Confirmed


 


Albuterol 2.5 mg INH Q4H PRN 09/08/13 02/25/24


 


Cetirizine [ZyrTEC] 10 mg PO DAILY 09/08/13 02/25/24


 


Diphenoxylate HCl/Atropine 1 each PO Q4HR PRN 09/08/13 02/25/24





[Lomotil Tablet]   


 


atenoloL [Tenormin] 50 mg PO DAILY 09/08/13 02/25/24


 


Oxycodone HCl/Acetaminophen 1 - 2 each PO Q6H PRN #10 tablet 06/06/14 02/25/24





[Percocet 5-325 mg Tablet]   


 


Gabapentin [Neurontin] 600 mg PO TID 01/13/23 02/25/24


 


LORazepam [Ativan] 0.5 mg PO PRN PRN 01/13/23 02/25/24


 


methocarbamoL [Methocarbamol] 750 mg PO TID 01/13/23 02/25/24


 


Amitriptyline [Elavil] 25 mg PO HS #30 tablet 07/21/23 02/25/24


 


Ondansetron Odt [Zofran] 4 mg TL Q6H PRN #30 tablet 12/09/23 02/25/24


 


Promethazine [Phenergan] 25 mg PO Q6H PRN #30 tab 12/09/23 02/25/24


 


Ondansetron Odt [Zofran] 4 mg TL Q6H PRN #20 tablet 02/25/24 














- Allergies


Allergies/Adverse Reactions: 


                                    Allergies











Allergy/AdvReac Type Severity Reaction Status Date / Time


 


Penicillins Allergy Intermediate Hives Verified 02/25/24 12:01


 


codeine [Codeine] Allergy  Hives Verified 12/28/23 21:34


 


Mushrooms Allergy  Anaphylaxis Uncoded 02/25/24 12:02














- Social History


Does the pt smoke?: No


Smoking Status: Never smoker


Does the pt drink ETOH?: No


Does the pt have substance abuse?: Yes





- Immunizations


Immunizations are current?: Yes





- POLST


Patient has POLST: No





PD ED PE NORMAL





- Vitals


Vital signs reviewed: Yes





- General


General: Alert and oriented X 3, Other (Slow to answer questions, but otherwise 

no distress)





- Cardiac


Cardiac: RRR, No murmur





- Respiratory


Respiratory: No respiratory distress, Clear bilaterally





- Abdomen


Abdomen: Other (Hyperactive bowel sounds with mild epigastric tenderness, no 

surgical signs.)





- Neuro


Neuro: Alert and oriented X 3





Results





- Vitals


Vitals: 


                               Vital Signs - 24 hr











  02/25/24





  11:45


 


Heart Rate 80


 


Respiratory 18





Rate 


 


Blood Pressure 150/90 H








                                     Oxygen











O2 Source                      Room air

















PD Medical Decision Making





- ED course


ED course: 





43-year-old woman presents with apparent exacerbation of cyclic vomiting 

syndrome.  After the administration of IV fluids, droperidol and Zofran she was 

feeling much better and requested discharge.  She passed p.o. challenge.





Departure





- Departure


Disposition: 01 Home, Self Care


Clinical Impression: 


 Cyclic vomiting syndrome





Condition: Good


Record reviewed to determine appropriate education?: Yes


Instructions:  ED Nausea Vomiting


Prescriptions: 


Ondansetron Odt [Zofran] 4 mg TL Q6H PRN #20 tablet


 PRN Reason: Nausea / Vomiting


Comments: 


Call your doctor to arrange a follow-up appointment, make the next available 

appointment.  In the interim, return anytime if worse or if new symptoms 

develop.

## 2024-03-27 ENCOUNTER — HOSPITAL ENCOUNTER (EMERGENCY)
Dept: HOSPITAL 76 - ED | Age: 44
Discharge: HOME | End: 2024-03-27
Payer: MEDICARE

## 2024-03-27 VITALS — DIASTOLIC BLOOD PRESSURE: 84 MMHG | SYSTOLIC BLOOD PRESSURE: 128 MMHG

## 2024-03-27 VITALS — OXYGEN SATURATION: 98 %

## 2024-03-27 DIAGNOSIS — J18.9: Primary | ICD-10-CM

## 2024-03-27 DIAGNOSIS — Z79.899: ICD-10-CM

## 2024-03-27 DIAGNOSIS — J06.9: ICD-10-CM

## 2024-03-27 LAB
B PARAPERT DNA SPEC QL NAA+PROBE: NOT DETECTED
B PERT DNA SPEC QL NAA+PROBE: NOT DETECTED
C PNEUM DNA NPH QL NAA+NON-PROBE: NOT DETECTED
FLUAV RNA RESP QL NAA+PROBE: NOT DETECTED
HAEM INFLU B DNA SPEC QL NAA+PROBE: NOT DETECTED
HCOV 229E RNA SPEC QL NAA+PROBE: NOT DETECTED
HCOV HKU1 RNA UPPER RESP QL NAA+PROBE: NOT DETECTED
HCOV NL63 RNA ASPIRATE QL NAA+PROBE: NOT DETECTED
HCOV OC43 RNA SPEC QL NAA+PROBE: NOT DETECTED
HMPV AG SPEC QL: NOT DETECTED
HPIV1 RNA NPH QL NAA+PROBE: NOT DETECTED
HPIV2 SPEC QL CULT: NOT DETECTED
HPIV3 AB TITR SER CF: NOT DETECTED {TITER}
HPIV4 RNA SPEC QL NAA+PROBE: NOT DETECTED
M PNEUMO DNA SPEC QL NAA+PROBE: NOT DETECTED
RSV RNA RESP QL NAA+PROBE: NOT DETECTED
RV+EV RNA SPEC QL NAA+PROBE: NOT DETECTED
SARS-COV-2 RNA PNL SPEC NAA+PROBE: NOT DETECTED

## 2024-03-27 PROCEDURE — 87633 RESP VIRUS 12-25 TARGETS: CPT

## 2024-03-27 PROCEDURE — 87430 STREP A AG IA: CPT

## 2024-03-27 PROCEDURE — 99284 EMERGENCY DEPT VISIT MOD MDM: CPT

## 2024-03-27 PROCEDURE — 87070 CULTURE OTHR SPECIMN AEROBIC: CPT

## 2024-03-27 PROCEDURE — 99283 EMERGENCY DEPT VISIT LOW MDM: CPT

## 2024-03-27 PROCEDURE — 96372 THER/PROPH/DIAG INJ SC/IM: CPT

## 2024-03-27 PROCEDURE — 71045 X-RAY EXAM CHEST 1 VIEW: CPT

## 2024-03-27 RX ADMIN — AZITHROMYCIN MONOHYDRATE STA MG: 250 TABLET ORAL at 11:10

## 2024-03-27 NOTE — ED PHYSICIAN DOCUMENTATION
ARGENIS PHILLIPS HEENT





- Stated complaint


Stated Complaint: FEVER/SOA/COUGH





- Chief complaint


Chief Complaint: Resp





- Additional information


Additional information: 


The patient comes to the emergency department chief complaint of URI and cough 

for the last approximately week.  She states that she had been running "and 

negative fever" with temperatures 96 to 97 degrees, when she suddenly spiked a 

temperature of 102 a few days ago.  She states that she felt as though something

was getting worse and has had some left-sided chest discomfort.  She states that

her cough, despite Tessalon Perles that her primary prescribed, has been getting

worse and that she feels mildly short of breath.  Her son was sick a week before

her with a similar illness and got pneumonia and ended up having to be on 

antibiotics.  She states she is concerned that this is possibly happening with 

her too.  No other complaints at this time.








PD PAST MEDICAL HISTORY





- Past Medical History


Past Medical History: Yes


Cardiovascular: Arrhythmia


Respiratory: Asthma


Neuro: Head injury, Migraines


Endocrine/Autoimmune: None


GI: Chronic diarrhea, Other


GYN: Ovarian cysts


: Other


HEENT: None


Psych: None


Musculoskeletal: Osteoarthritis, Fibromyalgia


Derm: Psoriasis





- Past Surgical History


Past Surgical History: Yes


General: Appendectomy


Ortho: Arthroscopic surgery


HEENT: Other





- Present Medications


Home Medications: 


                                Ambulatory Orders











 Medication  Instructions  Recorded  Confirmed


 


Cetirizine [ZyrTEC] 10 mg PO DAILY 09/08/13 03/27/24


 


Diphenoxylate HCl/Atropine 1 each PO Q4HR PRN 09/08/13 03/27/24





[Lomotil Tablet]   


 


atenoloL [Tenormin] 50 mg PO DAILY 09/08/13 03/27/24


 


Oxycodone HCl/Acetaminophen 1 - 2 each PO Q6H PRN #10 tablet 06/06/14 03/27/24





[Percocet 5-325 mg Tablet]   


 


LORazepam [Ativan] 0.5 mg PO PRN PRN 01/13/23 03/27/24


 


methocarbamoL [Methocarbamol] 750 mg PO TID 01/13/23 03/27/24


 


Promethazine [Phenergan] 25 mg PO Q6H PRN #30 tab 12/09/23 03/27/24


 


Ondansetron Odt [Zofran] 4 mg TL Q6H PRN #20 tablet 02/25/24 03/27/24


 


Albuterol Sulfate [Proventil Hfa] 200 puffs IH PRN PRN 03/27/24 03/27/24














- Allergies


Allergies/Adverse Reactions: 


                                    Allergies











Allergy/AdvReac Type Severity Reaction Status Date / Time


 


Penicillins Allergy Intermediate Hives Verified 03/27/24 09:21


 


bee venom protein (honey bee) Allergy  Anaphylaxis Verified 03/27/24 09:21


 


codeine [Codeine] Allergy  Hives Verified 03/27/24 09:21


 


Mushrooms Allergy  Anaphylaxis Uncoded 02/25/24 12:02














- Social History


Does the pt smoke?: No


Smoking Status: Never smoker


Does the pt drink ETOH?: No


Does the pt have substance abuse?: Yes


Substance Use and Type: Marijuana





- Immunizations


Immunizations are current?: Yes





- POLST


Patient has POLST: No





PD ED PE NORMAL





- Vitals


Vital signs reviewed: Yes





- General


General: Alert and oriented X 3, No acute distress, Well developed/nourished





- HEENT


HEENT: Atraumatic, PERRL, EOMI, Moist mucous membranes





- Neck


Neck: Supple, no meningeal sign





- Cardiac


Cardiac: RRR, No murmur





- Respiratory


Respiratory: No respiratory distress, Other (Persistent, mildly congested cough,

 coming in fits.  Rales throughout entire left lung fields.  Clear on right.)





- Abdomen


Abdomen: Normal bowel sounds, Soft, Non tender, Non distended





- Derm


Derm: Warm and dry





- Extremities


Extremities: No deformity





- Neuro


Neuro: Alert and oriented X 3





- Psych


Psych: Normal mood, Normal affect





Results





- Vitals


Vitals: 





                               Vital Signs - 24 hr











  03/27/24





  09:21


 


Temperature 37.4 C


 


Heart Rate 110 H


 


Respiratory 20





Rate 


 


Blood Pressure 146/80 H


 


O2 Saturation 96








                                     Oxygen











O2 Source                      Room air

















- Labs


Labs: 





                                Laboratory Tests











  03/27/24





  09:30


 


Group A Strep Rapid  Negative














- Rads (name of study)


  ** Chest x-ray


Relevant Findings:: Final report received, See rad report (Focal left basilar 

pneumonia. )





PD Medical Decision Making





- ED course


Complexity details: reviewed results, re-evaluated patient, considered 

differential, d/w patient


ED course: 


The patient was worked up with respiratory PCR panel and chest x-ray.  PCR panel

 still pending but chest x-ray showed a left basilar pneumonia.  I discussed 

this with the patient and this is consistent with her exam findings.  She has 

been started on Rocephin and Zithromax today and has been given a prescription 

for Zithromax.  We have discussed the need to complete the entire antibiotic 

course and we discussed the usual indications for return.








Departure





- Departure

## 2024-03-27 NOTE — XRAY REPORT
PROCEDURE:  Chest 1V

 

INDICATIONS:  cough/fever

 

TECHNIQUE:  One view of the chest was acquired.  

 

COMPARISON:  10/5/2023

 

FINDINGS:  

 

Surgical changes and devices:  None.  

 

Lungs and pleura:  No pleural effusions or pneumothorax. Small focal left basilar infiltrate.

 

Mediastinum:  Mediastinal contours appear normal.  Heart size is normal.  

 

Bones and chest wall:  No suspicious bony lesions.  Overlying soft tissues appear unremarkable.  

 

 

IMPRESSION:  

 

Small focal left basilar pneumonia.

 

Progress films are recommended until clear.

 

Reviewed by: John Salazar MD on 3/27/2024 9:55 AM PDT

Approved by: John Salazar MD on 3/27/2024 9:55 AM PDT

 

 

Station ID:  [id number]

## 2024-04-10 NOTE — ED PHYSICIAN DOCUMENTATION
ED Addendum





- Addendum


Addendum: 





04/10/24 05:48


Final Impression:  





1. Pneumonia


2.  Viral URI





Disposition:  Home in stable and improved condition

## 2024-04-18 ENCOUNTER — HOSPITAL ENCOUNTER (OUTPATIENT)
Dept: HOSPITAL 76 - DI | Age: 44
Discharge: HOME | End: 2024-04-18
Payer: MEDICARE

## 2024-04-18 DIAGNOSIS — J18.9: Primary | ICD-10-CM

## 2024-04-19 NOTE — XRAY REPORT
PROCEDURE:  Chest 2V

 

INDICATIONS:  PNA

 

TECHNIQUE:  2 views of the chest were obtained.  

 

COMPARISON:  3/27/2024

 

FINDINGS:  

 

Surgical changes and devices:  None.  

 

Lungs and pleura:  No pleural effusions or pneumothorax.  Lungs are clear.  

 

Mediastinum:  Mediastinal contours appear normal.  Heart size is normal.

 

Bones and chest wall:  No suspicious bony lesions.  Overlying soft tissues appear unremarkable.  

 

IMPRESSION:  

 

Normal two-view chest x-ray

 

Reviewed by: Preston Reddy MD on 4/19/2024 9:35 AM [facility]

Approved by: Preston Reddy MD on 4/19/2024 9:35 AM [facility]

 

 

Station ID:  [id number]

## 2024-06-12 ENCOUNTER — HOSPITAL ENCOUNTER (OUTPATIENT)
Dept: HOSPITAL 76 - DI | Age: 44
Discharge: HOME | End: 2024-06-12
Attending: STUDENT IN AN ORGANIZED HEALTH CARE EDUCATION/TRAINING PROGRAM
Payer: MEDICARE

## 2024-06-12 DIAGNOSIS — M17.12: ICD-10-CM

## 2024-06-12 DIAGNOSIS — M94.262: ICD-10-CM

## 2024-06-12 DIAGNOSIS — S83.412A: ICD-10-CM

## 2024-06-12 DIAGNOSIS — M25.462: ICD-10-CM

## 2024-06-12 DIAGNOSIS — S83.242A: Primary | ICD-10-CM

## 2024-06-12 DIAGNOSIS — S83.512A: ICD-10-CM

## 2024-06-13 NOTE — XRAY REPORT
PROCEDURE:  Knee 3V LT

 

INDICATIONS:  LT KNEE PAIN

 

TECHNIQUE:  3 views of the knee were acquired.  

 

COMPARISON:  Left knee radiographs 9/21/2023.

 

FINDINGS:  

 

Bones:  No acute fractures or dislocations.  No suspicious bony lesions.   Mild joint space narrowing
 is seen at the medial femorotibial compartment. Tiny marginal segments are seen at the patellofemora
l compartment

 

Soft tissues:  Moderate knee joint effusion. No suspicious soft tissue calcifications.  

 

 

IMPRESSION:  

Mild medial compartment osteoarthrosis. Moderate joint effusion.

 

 

 

Reviewed by: Miles Carmona MD on 6/13/2024 1:31 PM PDT

Approved by: Miles Carmona MD on 6/13/2024 1:31 PM PDT

 

 

Station ID:  [id number]

## 2024-06-13 NOTE — MRI REPORT
PROCEDURE:  Knee LT WO

 

INDICATIONS:  LT KNEE PAIN

 

TECHNIQUE:  

Noncontrast sagittal PD fast spin echo and T2 fast spin echo with fat saturation, sagittal 3-D gradie
nt sequence with fat saturation; coronal T1 spin echo and PD fast spin echo with fat saturation, and 
axial PD fast spin echo with fat saturation through the knee.  

 

COMPARISON:  None.

 

FINDINGS:  

Image quality:  Excellent.  

 

Menisci: Peripheral displacement of medial meniscus bowing medial collateral ligament is seen. Obliqu
e tear involving posterior horn of medial meniscus extending to inferior articulating surface. The la
teral meniscus is intact. The meniscal root ligaments are intact.

 

Cruciate ligaments:  The anterior cruciate ligament is thickened with subtle intrasubstance T2 hyperi
ntense signal. The posterior cruciate ligament is intact.

 

Medial structures:  The medial collateral ligament appears mildly thickened with adjacent soft tissue
 edema. Visualized portions of the pes anserinus tendons appear normal.  No abnormal bursal fluid.  

 

Lateral structures:  The lateral collateral ligament is thickened at its femoral insertion. The long 
and short heads of the biceps femoris tendon appear intact.  The popliteus tendon appears normal. Charo
otibial band appears normal.  

 

Anterior structures:  The quadriceps and patellar tendons appear intact.  Patellar alignment is everton
l.  No femoral trochlear dysplasia or ventral trochlear prominence.  No edema in the infrapatellar fa
t pad.  

 

Bones and cartilage:  No bone marrow contusions or fractures. Mild-to-moderate tricompartmental osteo
arthritis and chondromalacia is seen most notably in medial femoral tibial compartment.

 

Joint space:  There is small to moderate knee joint fluid.  No Baker's cyst.  Normal appearing synovi
al plicae are incidentally noted.  

 

IMPRESSION:

1. Oblique tear involving posterior horn of medial meniscus extending to inferior articular surface. 
The lateral meniscus is intact.

2. Low-grade sprain involving anterior cruciate ligament. No ACL rupture.

3. Low to moderate grade MCL sprain/partial thickness tear. Low-grade proximal MCL sprain.

4. Mild to moderate tricompartmental osteophytosis and chondromalacia. No fracture or dislocation. Sm
all to moderate joint effusion, no loose bodies.

 

Reviewed by: Shakeel Flanagan MD on 6/13/2024 2:03 PM PDT

Approved by: Shakeel Flanagan MD on 6/13/2024 2:03 PM PDT

 

 

Station ID:  [id number]

## 2024-09-02 ENCOUNTER — HOSPITAL ENCOUNTER (EMERGENCY)
Dept: HOSPITAL 76 - ED | Age: 44
Discharge: HOME | End: 2024-09-02
Payer: MEDICARE

## 2024-09-02 VITALS — DIASTOLIC BLOOD PRESSURE: 85 MMHG | OXYGEN SATURATION: 95 % | SYSTOLIC BLOOD PRESSURE: 139 MMHG

## 2024-09-02 DIAGNOSIS — J45.909: ICD-10-CM

## 2024-09-02 DIAGNOSIS — M79.7: ICD-10-CM

## 2024-09-02 DIAGNOSIS — Z79.899: ICD-10-CM

## 2024-09-02 DIAGNOSIS — I49.9: ICD-10-CM

## 2024-09-02 DIAGNOSIS — U07.1: Primary | ICD-10-CM

## 2024-09-02 PROCEDURE — 99283 EMERGENCY DEPT VISIT LOW MDM: CPT

## 2024-09-02 NOTE — XRAY REPORT
PROCEDURE:  Chest 2V

 

INDICATIONS:  cough

 

TECHNIQUE:  2 views of the chest were acquired.  

 

COMPARISON:  Chest radiograph on April 18, 2024.

 

FINDINGS:  

 

Surgical changes and devices:  None.  

 

Lungs and pleura:  No pleural effusions or pneumothorax.  Lungs are clear.  

 

Mediastinum:  Mediastinal contours appear normal.  Heart size is normal.  

 

Bones and chest wall:  No suspicious bony lesions.  Overlying soft tissues appear unremarkable.  

 

 

IMPRESSION:  

 

No acute cardiopulmonary process.

 

 

 

Reviewed by: Jorge Alberto Mari MD on 9/2/2024 7:09 PM [facility]

Approved by: Jorge Alberto Mari MD on 9/2/2024 7:09 PM [facility]

 

 

Station ID:  [id number]

## 2024-09-02 NOTE — ED PHYSICIAN DOCUMENTATION
History of Present Illness





- Stated complaint


Stated Complaint: HA/SOA





- Chief complaint


Chief Complaint: General





- Additonal information


Additional information: 


Homero is a 42 yo female with PMHx of asthma, HLD, palpilations who presents to

the ED with cough, congestion and shortness of breath. Patient's symptoms ahve 

been going on for the past few days and started when her mother began having 

similar symptoms. she presents to the ED with her mother as well. She denies any

fevers. She denies getting her flu shot this year. She has tried her inhaler at 

home without significant relief.








PD PAST MEDICAL HISTORY





- Past Medical History


Past Medical History: Yes


Cardiovascular: Arrhythmia


Respiratory: Asthma


Neuro: Head injury, Migraines


Endocrine/Autoimmune: None


GI: Chronic diarrhea, Other


GYN: Ovarian cysts


: Other


HEENT: None


Psych: None


Musculoskeletal: Osteoarthritis, Fibromyalgia


Derm: Psoriasis





- Past Surgical History


Past Surgical History: Yes


General: Appendectomy


Ortho: Arthroscopic surgery


HEENT: Other





- Present Medications


Home Medications: 


                                Ambulatory Orders











 Medication  Instructions  Recorded  Confirmed


 


Cetirizine [ZyrTEC] 10 mg PO DAILY 09/08/13 03/27/24


 


Diphenoxylate HCl/Atropine 1 each PO Q4HR PRN 09/08/13 03/27/24





[Lomotil Tablet]   


 


atenoloL [Tenormin] 50 mg PO DAILY 09/08/13 03/27/24


 


Oxycodone HCl/Acetaminophen 1 - 2 each PO Q6H PRN #10 tablet 06/06/14 03/27/24





[Percocet 5-325 mg Tablet]   


 


LORazepam [Ativan] 0.5 mg PO PRN PRN 01/13/23 03/27/24


 


methocarbamoL [Methocarbamol] 750 mg PO TID 01/13/23 03/27/24


 


Promethazine [Phenergan] 25 mg PO Q6H PRN #30 tab 12/09/23 03/27/24


 


Ondansetron Odt [Zofran] 4 mg TL Q6H PRN #20 tablet 02/25/24 03/27/24


 


Albuterol Sulfate [Proventil Hfa] 200 puffs IH PRN PRN 03/27/24 03/27/24


 


Azithromycin [Zithromax] 0 mg PO DAILY #6 tablet 03/27/24 


 


Benzonatate [Tessalon] 100 mg PO TID #10 cap 09/02/24 


 


Molnupiravir [Lagevrio (Eua)] 800 mg PO BID #10 cap 09/02/24 














- Allergies


Allergies/Adverse Reactions: 


                                    Allergies











Allergy/AdvReac Type Severity Reaction Status Date / Time


 


Penicillins Allergy Intermediate Hives Verified 09/02/24 17:31


 


bee venom protein (honey bee) Allergy  Anaphylaxis Verified 09/02/24 17:31


 


codeine [Codeine] Allergy  Hives Verified 09/02/24 17:31


 


midazolam [From Versed] AdvReac  Hallucinati Verified 09/02/24 17:32





   ons  


 


Mushrooms Allergy  Anaphylaxis Uncoded 02/25/24 12:02














- Social History


Does the pt smoke?: No


Smoking Status: Never smoker


Does the pt drink ETOH?: No


Does the pt have substance abuse?: Yes


Substance Use and Type: Marijuana





- Immunizations


Immunizations are current?: Yes





- POLST


Patient has POLST: No





PD ED PE NORMAL





- Vitals


Vital signs reviewed: Yes





- General


General: Alert and oriented X 3





- HEENT


HEENT: Atraumatic, Moist mucous membranes





- Neck


Neck: Supple, no meningeal sign





- Cardiac


Cardiac: RRR, No murmur





- Respiratory


Respiratory: No respiratory distress, Clear bilaterally





- Derm


Derm: Normal color, Warm and dry





- Extremities


Extremities: No edema





- Neuro


Neuro: Alert and oriented X 3





Results





- Vitals


Vitals: 


                                     Oxygen











O2 Source                      Room air

















- Rads (name of study)


  ** Chest X-ray


Relevant Findings:: EMP independent interpretation of test





PD Medical Decision Making





- ED course


Complexity details: reviewed old records, reviewed results


ED course: 


Homero is a 42 yo female presenting to the ED with cough, congestion headache 

and sore throat, symptoms started less than 3 days ago. Rebeca had a positive 

home covid test. Vitals are stable on arrival. Physical exam here in the ED 

shows no acute findings. Chest X-ray in triage is reassuring. Discussed with 

patient and her daughter given reassuring findings patient is safe for discharge

home. Discussed with patient will start her on malpunavir given low risk of 

interactions with her home medications. Rebeca is agreeable with this plan. she

will return with any sob, cough, dizziness, lightheadedness or any other new or 

worsening symptoms. Rebeca understands and is agreeable with this plan.








Departure





- Departure


Disposition: 01 Home, Self Care


Clinical Impression: 


 COVID-19





Condition: Good


Instructions:  ED Viral Syndrome


Prescriptions: 


Molnupiravir [Lagevrio (Eua)] 800 mg PO BID #10 cap


Benzonatate [Tessalon] 100 mg PO TID #10 cap


Comments: 


You were seen here in the emergency department for your COVID symptoms have 

started you on antiviral and anticough medication return with any worsening 

symptoms including shortness of breath continue to monitor your oxygen on pulse 

ox and return to the emergency department with any other new or worsening 

symptoms.


Forms:  PCP List


Discharge Date/Time: 09/02/24 21:38

## 2024-09-11 ENCOUNTER — HOSPITAL ENCOUNTER (EMERGENCY)
Dept: HOSPITAL 76 - ED | Age: 44
Discharge: HOME | End: 2024-09-11
Payer: MEDICARE

## 2024-09-11 VITALS — OXYGEN SATURATION: 98 % | DIASTOLIC BLOOD PRESSURE: 82 MMHG | SYSTOLIC BLOOD PRESSURE: 134 MMHG

## 2024-09-11 DIAGNOSIS — L91.8: Primary | ICD-10-CM

## 2024-09-11 PROCEDURE — 11200 RMVL SKIN TAGS UP TO&INC 15: CPT

## 2024-09-11 PROCEDURE — 99283 EMERGENCY DEPT VISIT LOW MDM: CPT

## 2024-09-11 NOTE — ED PHYSICIAN DOCUMENTATION
PD HPI SKIN





- Stated complaint


Stated Complaint: RT SIDE ABD PX





- Chief complaint


Chief Complaint: Wound





- History obtained from


History obtained from: Patient





- Additional information


Additional information: 





She has had a longstanding lesion on the abdominal wall of the right upper 

quadrant that she thought was a skin tag.  Over the last 4 to 5 days it has 

become discolored and painful.





PD PAST MEDICAL HISTORY





- Past Medical History


Cardiovascular: Arrhythmia


Respiratory: Asthma


Neuro: Head injury, Migraines


Endocrine/Autoimmune: None


GI: Chronic diarrhea, Other


GYN: Ovarian cysts


: Other


HEENT: None


Psych: None


Musculoskeletal: Osteoarthritis, Fibromyalgia


Derm: Psoriasis





- Past Surgical History


Past Surgical History: Yes


General: Appendectomy


Ortho: Arthroscopic surgery


HEENT: Other





- Present Medications


Home Medications: 


                                Ambulatory Orders











 Medication  Instructions  Recorded  Confirmed


 


Cetirizine [ZyrTEC] 10 mg PO DAILY 09/08/13 03/27/24


 


Diphenoxylate HCl/Atropine 1 each PO Q4HR PRN 09/08/13 03/27/24





[Lomotil Tablet]   


 


atenoloL [Tenormin] 50 mg PO DAILY 09/08/13 03/27/24


 


Oxycodone HCl/Acetaminophen 1 - 2 each PO Q6H PRN #10 tablet 06/06/14 03/27/24





[Percocet 5-325 mg Tablet]   


 


LORazepam [Ativan] 0.5 mg PO PRN PRN 01/13/23 03/27/24


 


methocarbamoL [Methocarbamol] 750 mg PO TID 01/13/23 03/27/24


 


Promethazine [Phenergan] 25 mg PO Q6H PRN #30 tab 12/09/23 03/27/24


 


Ondansetron Odt [Zofran] 4 mg TL Q6H PRN #20 tablet 02/25/24 03/27/24


 


Albuterol Sulfate [Proventil Hfa] 200 puffs IH PRN PRN 03/27/24 03/27/24


 


Azithromycin [Zithromax] 0 mg PO DAILY #6 tablet 03/27/24 


 


Benzonatate [Tessalon] 100 mg PO TID #10 cap 09/02/24 


 


Molnupiravir [Lagevrio (Eua)] 800 mg PO BID #10 cap 09/02/24 














- Allergies


Allergies/Adverse Reactions: 


                                    Allergies











Allergy/AdvReac Type Severity Reaction Status Date / Time


 


Penicillins Allergy Intermediate Hives Verified 09/11/24 09:12


 


bee venom protein (honey bee) Allergy  Anaphylaxis Verified 09/11/24 09:12


 


codeine [Codeine] Allergy  Hives Verified 09/11/24 09:12


 


midazolam [From Versed] AdvReac  Hallucinati Verified 09/11/24 09:12





   ons  


 


Mushrooms Allergy  Anaphylaxis Uncoded 09/11/24 09:12














- Social History


Does the pt smoke?: No


Smoking Status: Never smoker


Does the pt drink ETOH?: No


Does the pt have substance abuse?: Yes





- Immunizations


Immunizations are current?: Yes





- POLST


Patient has POLST: No





PD ED PE NORMAL





- Vitals


Vital signs reviewed: Yes





- General


General: Alert and oriented X 3, No acute distress





- Derm


Derm: Other (On the right upper quadrant abdominal wall there is a very small 

pedunculated dark lesion without cellulitis.  It looks like an ischemic skin 

tag.)





- Neuro


Neuro: Alert and oriented X 3





Results





- Vitals


Vitals: 


                               Vital Signs - 24 hr











  09/11/24





  09:12


 


Temperature 36.8 C


 


Heart Rate 70


 


Respiratory 16





Rate 


 


Blood Pressure 154/97 H


 


O2 Saturation 99








                                     Oxygen











O2 Source                      Room air

















Procedures





- General procedure


General procedure: 





Biopsy of skin:


After verbal informed consent and request by the patient the area of concern in 

the right upper quadrant was prepped with ChloraPrep and sterilely draped.  

Locally infiltrated with 1% lidocaine with epinephrine and then using a scalpel 

a small curved incision was made in the subcutaneous tissue on either side of 

the lesion and it was removed in its entirety.  A single 4-0 nylon suture was 

placed.  The resultant tissue was sent for pathology.





PD Medical Decision Making





- ED course


ED course: 





She has what looks like an ischemic painful skin tag and she wanted it removed. 

A small biopsy was done and it was removed in its entirety and sent for 

pathology with a single suture placed.





Departure





- Departure


Disposition: 01 Home, Self Care


Clinical Impression: 


 Skin tag





Condition: Good


Record reviewed to determine appropriate education?: Yes


Comments: 


You were seen for a skin lesion that is painful, most likely an ischemic skin 

tag.  This was removed and a single suture was placed.  Follow-up with Dr. Culp in approximately 2 weeks for suture removal and review of pathology on 

the lesion.  For wound care simple soap and water and applying Band-Aid is all 

you really need to do.  Return for new or worsening symptoms.


Forms:  PCP List

## 2024-09-16 NOTE — ED PHYSICIAN DOCUMENTATION
ED Addendum





- Addendum


Addendum: 





09/16/24 12:10


Pathology received "consistent with infarcted fibroepithelial polyp/skin tag".  

This is what was expected clinically.  I did call the patient and speak with her

personally and let her know the results.